# Patient Record
Sex: FEMALE | Race: WHITE | Employment: PART TIME | URBAN - METROPOLITAN AREA
[De-identification: names, ages, dates, MRNs, and addresses within clinical notes are randomized per-mention and may not be internally consistent; named-entity substitution may affect disease eponyms.]

---

## 2017-07-11 PROBLEM — N32.81 OAB (OVERACTIVE BLADDER): Status: ACTIVE | Noted: 2017-07-11

## 2018-01-16 PROBLEM — Z79.899 ENCOUNTER FOR LONG-TERM CURRENT USE OF MEDICATION: Status: ACTIVE | Noted: 2018-01-16

## 2018-07-26 PROBLEM — J30.9 ALLERGIC RHINITIS DUE TO ALLERGEN: Status: ACTIVE | Noted: 2018-07-26

## 2019-10-24 PROBLEM — G47.14: Status: ACTIVE | Noted: 2019-10-24

## 2019-11-19 PROBLEM — F50.81 BINGE-EATING DISORDER, MODERATE: Status: ACTIVE | Noted: 2019-11-19

## 2020-06-02 ENCOUNTER — HOSPITAL ENCOUNTER (OUTPATIENT)
Dept: MAMMOGRAPHY | Age: 40
Discharge: HOME | End: 2020-06-02
Attending: OBSTETRICS & GYNECOLOGY
Payer: COMMERCIAL

## 2020-06-02 DIAGNOSIS — Z12.31 VISIT FOR SCREENING MAMMOGRAM: ICD-10-CM

## 2020-06-02 PROCEDURE — 77063 BREAST TOMOSYNTHESIS BI: CPT

## 2020-07-23 PROBLEM — D48.5 NEOPLASM OF UNCERTAIN BEHAVIOR OF SKIN: Status: ACTIVE | Noted: 2020-07-23

## 2020-07-23 PROBLEM — R06.00 DYSPNEA: Status: ACTIVE | Noted: 2020-07-23

## 2021-06-04 ENCOUNTER — HOSPITAL ENCOUNTER (OUTPATIENT)
Dept: MAMMOGRAPHY | Age: 41
Discharge: HOME | End: 2021-06-04
Attending: OBSTETRICS & GYNECOLOGY
Payer: COMMERCIAL

## 2021-06-04 DIAGNOSIS — Z12.31 OTHER SCREENING MAMMOGRAM: ICD-10-CM

## 2021-06-04 PROCEDURE — 77063 BREAST TOMOSYNTHESIS BI: CPT

## 2022-03-18 PROBLEM — J30.9 ALLERGIC RHINITIS DUE TO ALLERGEN: Status: ACTIVE | Noted: 2018-07-26

## 2022-03-19 PROBLEM — D48.5 NEOPLASM OF UNCERTAIN BEHAVIOR OF SKIN: Status: ACTIVE | Noted: 2020-07-23

## 2022-03-19 PROBLEM — R06.00 DYSPNEA: Status: ACTIVE | Noted: 2020-07-23

## 2022-03-19 PROBLEM — G47.14: Status: ACTIVE | Noted: 2019-10-24

## 2022-03-20 PROBLEM — F50.81 BINGE-EATING DISORDER, MODERATE: Status: ACTIVE | Noted: 2019-11-19

## 2022-03-20 PROBLEM — N32.81 OAB (OVERACTIVE BLADDER): Status: ACTIVE | Noted: 2017-07-11

## 2022-03-20 PROBLEM — Z79.899 ENCOUNTER FOR LONG-TERM CURRENT USE OF MEDICATION: Status: ACTIVE | Noted: 2018-01-16

## 2022-03-20 PROBLEM — F50.811 BINGE-EATING DISORDER, MODERATE: Status: ACTIVE | Noted: 2019-11-19

## 2022-05-26 ENCOUNTER — OFFICE VISIT (OUTPATIENT)
Dept: OBGYN CLINIC | Age: 42
End: 2022-05-26
Payer: COMMERCIAL

## 2022-05-26 VITALS — SYSTOLIC BLOOD PRESSURE: 120 MMHG | DIASTOLIC BLOOD PRESSURE: 80 MMHG | WEIGHT: 169 LBS | BODY MASS INDEX: 29.01 KG/M2

## 2022-05-26 DIAGNOSIS — N94.3 PMS (PREMENSTRUAL SYNDROME): Primary | ICD-10-CM

## 2022-05-26 PROCEDURE — 99213 OFFICE O/P EST LOW 20 MIN: CPT | Performed by: OBSTETRICS & GYNECOLOGY

## 2022-05-26 RX ORDER — DULOXETIN HYDROCHLORIDE 60 MG/1
CAPSULE, DELAYED RELEASE ORAL
COMMUNITY
Start: 2022-05-24

## 2022-05-26 RX ORDER — ARIPIPRAZOLE 5 MG/1
TABLET ORAL
COMMUNITY
Start: 2022-02-28 | End: 2022-05-26

## 2022-05-26 RX ORDER — ARIPIPRAZOLE 10 MG/1
TABLET ORAL
COMMUNITY
Start: 2022-05-11

## 2022-05-26 ASSESSMENT — ENCOUNTER SYMPTOMS
EYES NEGATIVE: 1
GASTROINTESTINAL NEGATIVE: 1
RESPIRATORY NEGATIVE: 1
ALLERGIC/IMMUNOLOGIC NEGATIVE: 1

## 2022-05-26 NOTE — PROGRESS NOTES
Name: Jordan Farooq  Date: 5/26/2022  YOB: 1980  LMP: Patient's last menstrual period was 04/21/2022. Jimmy Heller is a 42 y.o. Jaylen Laws is here for a problem visit sweating a lot the week before her period. She is also emotional the week before her period. Menstrual status: regular cycles    Health Maintenance:  HPV vaccine: not applicable  Colonoscopy: not applicable  Dexa scan: not applicable    No flowsheet data found. Review of Systems   Constitutional: Negative. HENT: Negative. Eyes: Negative. Respiratory: Negative. Cardiovascular: Negative. Gastrointestinal: Negative. Endocrine: Negative. Genitourinary: Negative. Musculoskeletal: Negative. Skin: Negative. Allergic/Immunologic: Negative. Neurological: Negative. Hematological: Negative. Psychiatric/Behavioral: Negative. Negative for self-injury and suicidal ideas. No flowsheet data found.     Past Medical History:  No date: Lumbago  No date: Nausea & vomiting      Comment:  post-op  No date: Schizophrenia ([facility] [address]  No date: Scoliosis     Past Surgical History:  2014: DILATION AND CURETTAGE OF UTERUS      Comment:  removed endometrial polyp  1980's: HEENT      Comment:  ear surgery  1980's: HEENT      Comment:  cleft palate repair  1991: HEENT      Comment:  oral surgery       Current Outpatient Medications:     ARIPiprazole (ABILIFY) 10 mg tablet, , Disp: , Rfl:     DULoxetine (CYMBALTA) 60 MG extended release capsule, , Disp: , Rfl:     clobetasol (TEMOVATE) 0.05 % ointment, Apply topically 2 times daily, Disp: , Rfl:     desogestrel-ethinyl estradiol (KARIVA) 0.15-0.02/0.01 MG (21/5) per tablet, Take 1 tablet by mouth daily, Disp: , Rfl:     DULoxetine (CYMBALTA) 20 MG extended release capsule, 2 capsules once a day, Disp: , Rfl:     eletriptan (RELPAX) 40 MG tablet, Take 40 mg by mouth, Disp: , Rfl:     nystatin-triamcinolone (MYCOLOG II) 480343-9.1 UNIT/GM-% cream, Apply topically 2 times daily, Disp: , Rfl:     Family Cancer History:   Cancer-related family history includes Cancer in her father. There is no history of Breast Cancer, Ovarian Cancer, or Colon Cancer. Social History:  reports that she has never smoked. She has never used smokeless tobacco. She reports that she does not drink alcohol and does not use drugs. Ob History:  No obstetric history on file. Sexual History:  has no history on file for sexual activity. Vitals:  weight is 169 lb (76.7 kg). Her blood pressure is 120/80. Body mass index is 29.01 kg/m². Physical Exam  Constitutional:       General: She is awake. She is not in acute distress. Appearance: Normal appearance. She is not ill-appearing. HENT:      Head: Normocephalic and atraumatic. Eyes:      Conjunctiva/sclera: Conjunctivae normal.   Cardiovascular:      Rate and Rhythm: Normal rate. Pulmonary:      Effort: Pulmonary effort is normal.   Musculoskeletal:         General: Normal range of motion. Cervical back: Normal range of motion. Skin:     General: Skin is warm and dry. Neurological:      General: No focal deficit present. Mental Status: She is alert and oriented to person, place, and time. Motor: Motor function is intact. Coordination: Coordination is intact. Psychiatric:         Behavior: Behavior normal.         Cognition and Memory: Cognition normal.         Judgment: Judgment normal.            Assessment: 42 y.o. , No obstetric history on file. , here for problem visit, PMS  1. pms: discussed options, ocp vs observation. will follow for now.           Ernie Almanza MD

## 2022-06-06 ENCOUNTER — HOSPITAL ENCOUNTER (OUTPATIENT)
Dept: MAMMOGRAPHY | Age: 42
Discharge: HOME | End: 2022-06-09
Payer: COMMERCIAL

## 2022-06-06 DIAGNOSIS — Z12.31 ENCOUNTER FOR SCREENING MAMMOGRAM FOR MALIGNANT NEOPLASM OF BREAST: ICD-10-CM

## 2022-06-06 PROCEDURE — 77063 BREAST TOMOSYNTHESIS BI: CPT

## 2022-07-14 ENCOUNTER — OFFICE VISIT (OUTPATIENT)
Dept: OBGYN CLINIC | Age: 42
End: 2022-07-14
Payer: COMMERCIAL

## 2022-07-14 VITALS — WEIGHT: 173 LBS | SYSTOLIC BLOOD PRESSURE: 110 MMHG | BODY MASS INDEX: 29.7 KG/M2 | DIASTOLIC BLOOD PRESSURE: 80 MMHG

## 2022-07-14 DIAGNOSIS — N93.9 ABNORMAL UTERINE BLEEDING: Primary | ICD-10-CM

## 2022-07-14 PROCEDURE — 99213 OFFICE O/P EST LOW 20 MIN: CPT | Performed by: OBSTETRICS & GYNECOLOGY

## 2022-07-14 RX ORDER — MEDROXYPROGESTERONE ACETATE 10 MG/1
10 TABLET ORAL DAILY
Qty: 30 TABLET | Refills: 3 | Status: SHIPPED | OUTPATIENT
Start: 2022-07-14

## 2022-07-14 RX ORDER — NORGESTIMATE AND ETHINYL ESTRADIOL 0.25-0.035
1 KIT ORAL DAILY
Qty: 1 PACKET | Refills: 3 | Status: SHIPPED | OUTPATIENT
Start: 2022-07-14 | End: 2022-11-02 | Stop reason: SDUPTHER

## 2022-07-14 ASSESSMENT — ENCOUNTER SYMPTOMS
EYES NEGATIVE: 1
RESPIRATORY NEGATIVE: 1
ALLERGIC/IMMUNOLOGIC NEGATIVE: 1
GASTROINTESTINAL NEGATIVE: 1

## 2022-07-14 NOTE — PROGRESS NOTES
Name: David Murray  Date: 2022  YOB: 1980  LMP: Patient's last menstrual period was 2022. Michael Lozano is a 42 y.o.   who is here for aub. She has been bleeding dark blood on and off for 8 weeks. She has cont taking her ocp and has not missed any. She is currently 2 1/2 weeks into her pack. Birth control: ocp  Menstrual status: irregular cycles  Gyn Surgery: endometrial polypectomy    Health Maintenance:  Pap Smear:3/20/22 negative  Any history of abnormal pap smear? no  Mammo: 2022 negative  HPV vaccine: not done  Colonoscopy: not ever done  Dexa scan: not applicable    Review of Systems   Constitutional: Negative. HENT: Negative. Eyes: Negative. Respiratory: Negative. Cardiovascular: Negative. Gastrointestinal: Negative. Endocrine: Negative. Genitourinary: Negative. Musculoskeletal: Negative. Skin: Negative. Allergic/Immunologic: Negative. Neurological: Negative. Hematological: Negative. Psychiatric/Behavioral: Negative. Negative for self-injury and suicidal ideas.         Past Medical History:  No date: Lumbago  No date: Nausea & vomiting      Comment:  post-op  No date: Schizophrenia ([facility] [address]  No date: Scoliosis     Past Surgical History:  2014: DILATION AND CURETTAGE OF UTERUS      Comment:  removed endometrial polyp  : HEENT      Comment:  ear surgery  s: HEENT      Comment:  cleft palate repair  1991: HEENT      Comment:  oral surgery       Current Outpatient Medications:     ARIPiprazole (ABILIFY) 10 mg tablet, , Disp: , Rfl:     DULoxetine (CYMBALTA) 60 MG extended release capsule, , Disp: , Rfl:     clobetasol (TEMOVATE) 0.05 % ointment, Apply topically 2 times daily, Disp: , Rfl:     desogestrel-ethinyl estradiol (KARIVA) 0.15-0.02/0.01 MG () per tablet, Take 1 tablet by mouth daily, Disp: , Rfl:     DULoxetine (CYMBALTA) 20 MG extended release capsule, 2 capsules once a day, Disp: , Rfl:     eletriptan (RELPAX) 40 MG tablet, Take 40 mg by mouth, Disp: , Rfl:     nystatin-triamcinolone (MYCOLOG II) 266868-3.1 UNIT/GM-% cream, Apply topically 2 times daily, Disp: , Rfl:     Family Cancer History:   Cancer-related family history includes Cancer in her father. There is no history of Breast Cancer, Ovarian Cancer, or Colon Cancer. Social History:  reports that she has never smoked. She has never used smokeless tobacco. She reports that she does not drink alcohol and does not use drugs. Ob History:  # 1 - Date: None, Sex: None, Weight: None, GA: None, Delivery: None, Apgar1: None, Apgar5: None, Living: None, Birth Comments: None         Sexual History:  reports previously being sexually active. She reports using the following method of birth control/protection: Pill. PHYSICAL EXAM:  Vitals:  weight is 173 lb (78.5 kg). Her blood pressure is 110/80. Body mass index is 29.7 kg/m². Physical Exam  Constitutional:       General: She is awake. She is not in acute distress. Appearance: Normal appearance. She is not ill-appearing. HENT:      Head: Normocephalic and atraumatic. Eyes:      Conjunctiva/sclera: Conjunctivae normal.   Cardiovascular:      Rate and Rhythm: Normal rate. Pulmonary:      Effort: Pulmonary effort is normal.   Musculoskeletal:         General: Normal range of motion. Cervical back: Normal range of motion. Skin:     General: Skin is warm and dry. Neurological:      General: No focal deficit present. Mental Status: She is alert and oriented to person, place, and time. Motor: Motor function is intact. Coordination: Coordination is intact. Psychiatric:         Behavior: Behavior normal.         Cognition and Memory: Cognition normal.         Judgment: Judgment normal.          The external genitalia is normal.  Urethral meatus is normal. Vagina is pink and rugated. The bladder and urethra are normal  dark blood in vault.   The cervix is normal .  The uterus is normal. The ovaries are normal.       Assessment: 42 y.o. , , here for problem visit for irregular bleeding on mircette  1. Will stop ocp, do a provera wd and then restart orthocyclen  2.          Herminio Patiño MD

## 2022-07-15 ENCOUNTER — TELEPHONE (OUTPATIENT)
Dept: OBGYN CLINIC | Age: 42
End: 2022-07-15

## 2022-07-15 DIAGNOSIS — R30.0 DYSURIA: Primary | ICD-10-CM

## 2022-08-11 ENCOUNTER — OFFICE VISIT (OUTPATIENT)
Dept: OBGYN CLINIC | Age: 42
End: 2022-08-11
Payer: COMMERCIAL

## 2022-08-11 VITALS — DIASTOLIC BLOOD PRESSURE: 78 MMHG | SYSTOLIC BLOOD PRESSURE: 110 MMHG | WEIGHT: 174 LBS | BODY MASS INDEX: 29.87 KG/M2

## 2022-08-11 DIAGNOSIS — N92.0 MENORRHAGIA WITH REGULAR CYCLE: Primary | ICD-10-CM

## 2022-08-11 PROCEDURE — 99213 OFFICE O/P EST LOW 20 MIN: CPT | Performed by: OBSTETRICS & GYNECOLOGY

## 2022-08-11 ASSESSMENT — ENCOUNTER SYMPTOMS
EYES NEGATIVE: 1
RESPIRATORY NEGATIVE: 1
GASTROINTESTINAL NEGATIVE: 1
ALLERGIC/IMMUNOLOGIC NEGATIVE: 1

## 2022-08-11 NOTE — PROGRESS NOTES
Name: Jd Timmons  Date: 2022  YOB: 1980  LMP: Patient's last menstrual period was 2022. Nima Lamb is a 42 y.o.   who is here for a follow up. She was suppose to take provera for 10 days, have a period then start ocp. She has taken provera for almost 3 weeks. She is not bleeding. Birth control: none, does not want to be pregnant  Menstrual status: irregular cycles  Gyn Surgery: none    Health Maintenance:  Pap Smear: 3/3/22 negative  Any history of abnormal pap smear? no  Mammo: 2022  HPV vaccine: not done  Colonoscopy: not applicable  Dexa scan: not applicable    Review of Systems   Constitutional: Negative. HENT: Negative. Eyes: Negative. Respiratory: Negative. Cardiovascular: Negative. Gastrointestinal: Negative. Endocrine: Negative. Genitourinary: Negative. Musculoskeletal: Negative. Skin: Negative. Allergic/Immunologic: Negative. Neurological: Negative. Hematological: Negative. Psychiatric/Behavioral: Negative. Negative for self-injury and suicidal ideas.        Past Medical History:  No date: Lumbago  No date: Nausea & vomiting      Comment:  post-op  No date: Schizophrenia ([facility] [address]  No date: Scoliosis     Past Surgical History:  2014: DILATION AND CURETTAGE OF UTERUS      Comment:  removed endometrial polyp  : HEENT      Comment:  ear surgery  : HEENT      Comment:  cleft palate repair  1991: HEENT      Comment:  oral surgery       Current Outpatient Medications:     medroxyPROGESTERone (PROVERA) 10 MG tablet, Take 1 tablet by mouth daily, Disp: 30 tablet, Rfl: 3    norgestimate-ethinyl estradiol (ORTHO-CYCLEN, 28,) 0.25-35 MG-MCG per tablet, Take 1 tablet by mouth daily, Disp: 1 packet, Rfl: 3    ARIPiprazole (ABILIFY) 10 mg tablet, , Disp: , Rfl:     DULoxetine (CYMBALTA) 60 MG extended release capsule, , Disp: , Rfl:     clobetasol (TEMOVATE) 0.05 % ointment, Apply topically 2 times daily, Disp: , Rfl: desogestrel-ethinyl estradiol (KARIVA) 0.15-0.02/0.01 MG (21/5) per tablet, Take 1 tablet by mouth daily, Disp: , Rfl:     DULoxetine (CYMBALTA) 20 MG extended release capsule, 2 capsules once a day, Disp: , Rfl:     eletriptan (RELPAX) 40 MG tablet, Take 40 mg by mouth, Disp: , Rfl:     nystatin-triamcinolone (MYCOLOG II) 271835-0.1 UNIT/GM-% cream, Apply topically 2 times daily, Disp: , Rfl:     Family Cancer History:   Cancer-related family history includes Cancer in her father. There is no history of Breast Cancer, Ovarian Cancer, or Colon Cancer. Social History:  reports that she has never smoked. She has never used smokeless tobacco. She reports that she does not drink alcohol and does not use drugs. Ob History:  # 1 - Date: None, Sex: None, Weight: None, GA: None, Delivery: None, Apgar1: None, Apgar5: None, Living: None, Birth Comments: None         Sexual History:  reports that she is not currently sexually active. She reports using the following method of birth control/protection: Pill. PHYSICAL EXAM:  Vitals:  weight is 174 lb (78.9 kg). Body mass index is 29.87 kg/m². Physical Exam  Constitutional:       General: She is awake. She is not in acute distress. Appearance: Normal appearance. She is not ill-appearing. HENT:      Head: Normocephalic and atraumatic. Eyes:      Conjunctiva/sclera: Conjunctivae normal.   Cardiovascular:      Rate and Rhythm: Normal rate. Pulmonary:      Effort: Pulmonary effort is normal.   Musculoskeletal:         General: Normal range of motion. Cervical back: Normal range of motion. Skin:     General: Skin is warm and dry. Neurological:      General: No focal deficit present. Mental Status: She is alert and oriented to person, place, and time. Motor: Motor function is intact. Coordination: Coordination is intact.    Psychiatric:         Behavior: Behavior normal.         Cognition and Memory: Cognition normal.         Judgment: Judgment normal.          Assessment: 42 y.o. , , here for follow up for menorrhagia  I wrote out for her to take provera until Sat, expect menses mon, start ocp following sat.   If no period by thurs will call office and not start ocp         Yolanda Foss MD

## 2022-09-06 ENCOUNTER — OFFICE VISIT (OUTPATIENT)
Dept: UROLOGY | Age: 42
End: 2022-09-06
Payer: COMMERCIAL

## 2022-09-06 DIAGNOSIS — N32.81 OAB (OVERACTIVE BLADDER): Primary | ICD-10-CM

## 2022-09-06 DIAGNOSIS — N39.41 URGE INCONTINENCE: ICD-10-CM

## 2022-09-06 LAB
BILIRUBIN, URINE, POC: NEGATIVE
BLOOD URINE, POC: NEGATIVE
GLUCOSE URINE, POC: NEGATIVE
KETONES, URINE, POC: NEGATIVE
LEUKOCYTE ESTERASE, URINE, POC: NEGATIVE
NITRITE, URINE, POC: NEGATIVE
PH, URINE, POC: 6.5 (ref 4.6–8)
PROTEIN,URINE, POC: NEGATIVE
SPECIFIC GRAVITY, URINE, POC: 1.02 (ref 1–1.03)
URINALYSIS CLARITY, POC: NORMAL
URINALYSIS COLOR, POC: NORMAL
UROBILINOGEN, POC: NORMAL

## 2022-09-06 PROCEDURE — 81003 URINALYSIS AUTO W/O SCOPE: CPT | Performed by: NURSE PRACTITIONER

## 2022-09-06 PROCEDURE — 99203 OFFICE O/P NEW LOW 30 MIN: CPT | Performed by: NURSE PRACTITIONER

## 2022-09-06 RX ORDER — LORATADINE 10 MG/1
10 TABLET ORAL DAILY
COMMUNITY
Start: 2022-06-23

## 2022-09-06 RX ORDER — MONTELUKAST SODIUM 10 MG/1
10 TABLET ORAL NIGHTLY
COMMUNITY
Start: 2022-05-10

## 2022-09-06 ASSESSMENT — ENCOUNTER SYMPTOMS
EYE PAIN: 0
NAUSEA: 0
ABDOMINAL PAIN: 0
SKIN LESIONS: 0
HEARTBURN: 0
COUGH: 0
CONSTIPATION: 0
SHORTNESS OF BREATH: 0
BLOOD IN STOOL: 0
EYE DISCHARGE: 0
INDIGESTION: 0
WHEEZING: 0
BACK PAIN: 0
VOMITING: 0
DIARRHEA: 0

## 2022-09-06 NOTE — PROGRESS NOTES
[address], [facility] [address]  [phone]          Evans Chandler  : 1980    Chief Complaint   Patient presents with    New Patient          HPI     Evans Chandler is a 42 y.o. female    Patient referred to us due to ongoing urinary issues. The original referral was made for dysuria. Patient reports she is not having any dysuria but is having urinary frequency, nocturia and urge incontinence. She tells me between  and  she has had 2 or 3 pregnancies. She had partial birth abortions with these pregnancies. She feels that since that procedure she has had ongoing issues with her bladder. Her menstrual cycle has been abnormal.  She has recently been switched over to a different type of birth control to help control the menstrual cycle. She reports that during the day that she has to void about every 2 hours. Once she gets the urge to urinate she rushes to the bathroom and it sometimes does not make it. She wears a 1 overnight poise pad a day. There has been a couple occasions that she has leaked enough urine that she have to change her clothes. She denies any significant urinary infections. Unknown past family medical history. Her urine is clear today.   Past Medical History:   Diagnosis Date    Lumbago     Nausea & vomiting     post-op    Schizophrenia ([address]     Scoliosis      Past Surgical History:   Procedure Laterality Date    DILATION AND CURETTAGE OF UTERUS      removed endometrial polyp    HEENT      ear surgery    HEENT      cleft palate repair    HEENT      oral surgery     Current Outpatient Medications   Medication Sig Dispense Refill    montelukast (SINGULAIR) 10 MG tablet Take 10 mg by mouth nightly      loratadine (CLARITIN) 10 MG tablet Take 10 mg by mouth daily      ARIPiprazole (ABILIFY) 10 mg tablet       DULoxetine (CYMBALTA) 60 MG extended release capsule       DULoxetine (CYMBALTA) 20 MG extended release capsule 2 capsules once a day      nystatin-triamcinolone (MYCOLOG II) 266723-8.1 UNIT/GM-% cream Apply topically 2 times daily      medroxyPROGESTERone (PROVERA) 10 MG tablet Take 1 tablet by mouth daily (Patient not taking: Reported on 9/6/2022) 30 tablet 3    norgestimate-ethinyl estradiol (ORTHO-CYCLEN, 28,) 0.25-35 MG-MCG per tablet Take 1 tablet by mouth daily (Patient not taking: Reported on 9/6/2022) 1 packet 3    clobetasol (TEMOVATE) 0.05 % ointment Apply topically 2 times daily (Patient not taking: Reported on 9/6/2022)      desogestrel-ethinyl estradiol (KARIVA) 0.15-0.02/0.01 MG (21/5) per tablet Take 1 tablet by mouth daily (Patient not taking: Reported on 9/6/2022)      eletriptan (RELPAX) 40 MG tablet Take 40 mg by mouth (Patient not taking: Reported on 9/6/2022)       No current facility-administered medications for this visit.      Allergies   Allergen Reactions    Latex Rash     Turns red    Ciprofloxacin Other (See Comments)    Amoxicillin Diarrhea    Cephalexin Other (See Comments)    Cyclobenzaprine Hives    Naproxen Hives     Social History     Socioeconomic History    Marital status: Single     Spouse name: Not on file    Number of children: Not on file    Years of education: Not on file    Highest education level: Not on file   Occupational History    Not on file   Tobacco Use    Smoking status: Never    Smokeless tobacco: Never   Substance and Sexual Activity    Alcohol use: No    Drug use: Never    Sexual activity: Not Currently     Birth control/protection: Pill   Other Topics Concern    Not on file   Social History Narrative    Denies physical or sexual abuse     Social Determinants of Health     Financial Resource Strain: Not on file   Food Insecurity: Not on file   Transportation Needs: Not on file   Physical Activity: Not on file   Stress: Not on file   Social Connections: Not on file   Intimate Partner Violence: Not on file   Housing Stability: Not on file     Family History Problem Relation Age of Onset    Breast Cancer Neg Hx     Hypertension Father     Cancer Father     Ovarian Cancer Neg Hx     Colon Cancer Neg Hx        Review of Systems  Constitutional:   Negative for fever, chills, appetite change, malaise/fatigue, headaches and weight loss. Skin:  Negative for skin lesions, rash and itching. Eyes:  Negative for visual disturbance, eye pain and eye discharge. ENT:  Negative for difficulty articulating words, pain swallowing, high frequency hearing loss and dry mouth. Respiratory:  Negative for cough, blood in sputum, shortness of breath and wheezing. Cardiovascular:  Negative for chest pain, hypertension, irregular heartbeat, leg pain, leg swelling, regular rate and rhythm and varicose veins. GI:  Negative for nausea, vomiting, abdominal pain, blood in stool, constipation, diarrhea, indigestion and heartburn. Genitourinary: Positive for flank pain, nocturia, urgency, leakage w/ urge, frequent urination and menstrual problem. Negative for urinary burning, hematuria, recurrent UTIs, history of urolithiasis, slower stream, straining, incomplete emptying, sexually transmitted disease, endometriosis, vaginal pain and hysterectomy. Number of pregnancies:. Number of births: 0.  Musculoskeletal:  Negative for back pain, bone pain, arthralgias, tenderness, muscle weakness and neck pain. Neurological:  Negative for dizziness, focal weakness, numbness, seizures and tremors. Psychological:  Negative for depression and psychiatric problem. Endocrine:  Negative for cold intolerance, thirst, excessive urination, fatigue and heat intolerance. Hem/Lymphatic:  Negative for easy bleeding, easy bruising and frequent infections.     Urinalysis  UA - Dipstick  Results for orders placed or performed in visit on 09/06/22   AMB POC URINALYSIS DIP STICK AUTO W/O MICRO   Result Value Ref Range    Color (UA POC)      Clarity (UA POC)      Glucose, Urine, POC Negative Negative    Bilirubin, Urine, POC Negative Negative    KETONES, Urine, POC Negative Negative    Specific Gravity, Urine, POC 1.025 1.001 - 1.035    Blood (UA POC) Negative Negative    pH, Urine, POC 6.5 4.6 - 8.0    Protein, Urine, POC Negative Negative    Urobilinogen, POC 0.2 mg/dL     Nitrite, Urine, POC Negative Negative    Leukocyte Esterase, Urine, POC Negative Negative       PHYSICAL EXAM    GENERAL: No acute distress, Awake, Alert, Oriented X 3, Gait normal  ABDOMEN: soft, non tender, non-distended, positive bowel sounds, no organomegaly, no palpable masses, no guarding, no rebound tenderness  SKIN: No rash, no erythema, no lacerations or abrasions, no ecchymosis  MUSCULOSKELETAL - MAEW, no edema       Assessment and Plan    ICD-10-CM    1. OAB (overactive bladder)  N32.81 AMB POC URINALYSIS DIP STICK AUTO W/O MICRO      2. Urge incontinence  N39.41         Symptoms of OAB were discussed in detail. I encouraged patient to try to increase her water intake. I have instructed pt there are certain foods and beverages that will cause her symptoms to be worse. These include caffeine, alcohol and potentially spicy foods. Treatment options for OAB were also discussed. These include anticholinergic therapy, PTNS, biofeedback, Botox and, InterStim. PLAN:  Patient has not tried any medication therapy for the issue. Add Gemtesa 75 mg p.o. daily  She will follow-up in 6 weeks  Will reassess symptoms at this time and adjust treatment plan if needed. Stacy Shultz, NP, APRN - NP  Dr. Timoteo Ellison is supervising physician today and he approves plan of care. Return in about 6 weeks (around 10/18/2022) for for recheck. Elements of this note have been dictated using speech recognition software. Although reviewed, errors of speech recognition may have occurred.

## 2022-09-15 ENCOUNTER — OFFICE VISIT (OUTPATIENT)
Dept: OBGYN CLINIC | Age: 42
End: 2022-09-15
Payer: COMMERCIAL

## 2022-09-15 VITALS
DIASTOLIC BLOOD PRESSURE: 62 MMHG | BODY MASS INDEX: 29.06 KG/M2 | WEIGHT: 170.2 LBS | SYSTOLIC BLOOD PRESSURE: 118 MMHG | HEIGHT: 64 IN

## 2022-09-15 DIAGNOSIS — N94.6 MENSTRUAL PAIN: Primary | ICD-10-CM

## 2022-09-15 PROCEDURE — 99213 OFFICE O/P EST LOW 20 MIN: CPT | Performed by: OBSTETRICS & GYNECOLOGY

## 2022-09-15 ASSESSMENT — ENCOUNTER SYMPTOMS
GASTROINTESTINAL NEGATIVE: 1
RESPIRATORY NEGATIVE: 1
ALLERGIC/IMMUNOLOGIC NEGATIVE: 1
EYES NEGATIVE: 1

## 2022-09-15 NOTE — PROGRESS NOTES
Name: Osei Cook  Date: 9/15/2022  YOB: 1980  LMP: Patient's last menstrual period was 2022 (exact date). Sid Boyce is a 42 y.o.   who is here for a problem visit for discomfort with inserting tampons. Pt reports issue began in 2017 . Birth control: ocp  Menstrual status: regular cycles  Gyn Surgery:  pt reports polyp removed from uterine wall. Health Maintenance:  Pap Smear: last pap in , pathology WNL  Any history of abnormal pap smear? no  Mammo: last mammo in , pathology WNL  HPV vaccine: not done  Colonoscopy: not ever done  Dexa scan: not needed    Review of Systems   Constitutional: Negative. HENT: Negative. Eyes: Negative. Respiratory: Negative. Cardiovascular: Negative. Gastrointestinal: Negative. Endocrine: Negative. Genitourinary: Negative. Musculoskeletal: Negative. Skin: Negative. Allergic/Immunologic: Negative. Neurological: Negative. Hematological: Negative. Psychiatric/Behavioral: Negative.         Past Medical History:  No date: Lumbago  No date: Nausea & vomiting      Comment:  post-op  No date: Schizophrenia ([facility] [address]  No date: Scoliosis     Past Surgical History:  2014: DILATION AND CURETTAGE OF UTERUS      Comment:  removed endometrial polyp  s: HEENT      Comment:  ear surgery  : HEENT      Comment:  cleft palate repair  1991: HEENT      Comment:  oral surgery       Current Outpatient Medications:     montelukast (SINGULAIR) 10 MG tablet, Take 10 mg by mouth nightly, Disp: , Rfl:     loratadine (CLARITIN) 10 MG tablet, Take 10 mg by mouth daily, Disp: , Rfl:     ARIPiprazole (ABILIFY) 10 mg tablet, , Disp: , Rfl:     DULoxetine (CYMBALTA) 60 MG extended release capsule, , Disp: , Rfl:     DULoxetine (CYMBALTA) 20 MG extended release capsule, 2 capsules once a day, Disp: , Rfl:     nystatin-triamcinolone (MYCOLOG II) 284169-3.1 UNIT/GM-% cream, Apply topically 2 times daily, Disp: , Rfl: medroxyPROGESTERone (PROVERA) 10 MG tablet, Take 1 tablet by mouth daily (Patient not taking: Reported on 9/6/2022), Disp: 30 tablet, Rfl: 3    norgestimate-ethinyl estradiol (ORTHO-CYCLEN, 28,) 0.25-35 MG-MCG per tablet, Take 1 tablet by mouth daily (Patient not taking: Reported on 9/6/2022), Disp: 1 packet, Rfl: 3    clobetasol (TEMOVATE) 0.05 % ointment, Apply topically 2 times daily (Patient not taking: Reported on 9/6/2022), Disp: , Rfl:     desogestrel-ethinyl estradiol (KARIVA) 0.15-0.02/0.01 MG (21/5) per tablet, Take 1 tablet by mouth daily (Patient not taking: Reported on 9/6/2022), Disp: , Rfl:     eletriptan (RELPAX) 40 MG tablet, Take 40 mg by mouth (Patient not taking: Reported on 9/6/2022), Disp: , Rfl:     Family Cancer History:   Cancer-related family history includes Cancer in her father. There is no history of Breast Cancer, Ovarian Cancer, or Colon Cancer. Social History:  reports that she has never smoked. She has never used smokeless tobacco. She reports that she does not drink alcohol and does not use drugs. Ob History:  # 1 - Date: None, Sex: None, Weight: None, GA: None, Delivery: None, Apgar1: None, Apgar5: None, Living: None, Birth Comments: None         Sexual History:  reports that she is not currently sexually active. She reports using the following method of birth control/protection: Pill. PHYSICAL EXAM:  Vitals:  height is 5' 4\" (1.626 m) and weight is 170 lb 3.2 oz (77.2 kg). Her blood pressure is 118/62. Body mass index is 29.21 kg/m². Physical Exam  Constitutional:       General: She is awake. She is not in acute distress. Appearance: Normal appearance. She is not ill-appearing. HENT:      Head: Normocephalic and atraumatic. Eyes:      Conjunctiva/sclera: Conjunctivae normal.   Cardiovascular:      Rate and Rhythm: Normal rate. Pulmonary:      Effort: Pulmonary effort is normal.   Musculoskeletal:         General: Normal range of motion.       Cervical back: Normal range of motion. Skin:     General: Skin is warm and dry. Neurological:      General: No focal deficit present. Mental Status: She is alert and oriented to person, place, and time. Motor: Motor function is intact. Coordination: Coordination is intact. Psychiatric:         Behavior: Behavior normal.         Cognition and Memory: Cognition normal.         Judgment: Judgment normal.      Ext erythematous  Vagina: pink, no lesions    PROCEDURE: tampon insertion  Regular tampon inserted        Assessment: 42 y.o. , , here for problem visit for discomfort with tampons  Discussed tampon use not required  Tampon inserted in the office, she reports not uncomfortable and different from when she does it  Reviewed tampon insertion and making sure it was placed all of the way in.  4. Discussed lower flank discomfort c/w musculoskeletal pain.   Will cont to use ibuprofen prn

## 2022-11-03 RX ORDER — NORGESTIMATE AND ETHINYL ESTRADIOL 0.25-0.035
1 KIT ORAL DAILY
Qty: 1 PACKET | Refills: 3 | Status: SHIPPED | OUTPATIENT
Start: 2022-11-03

## 2022-11-10 ENCOUNTER — OFFICE VISIT (OUTPATIENT)
Dept: UROLOGY | Age: 42
End: 2022-11-10
Payer: COMMERCIAL

## 2022-11-10 DIAGNOSIS — N39.41 URGE INCONTINENCE: ICD-10-CM

## 2022-11-10 DIAGNOSIS — N32.81 OAB (OVERACTIVE BLADDER): Primary | ICD-10-CM

## 2022-11-10 LAB
BILIRUBIN, URINE, POC: NEGATIVE
BLOOD URINE, POC: NORMAL
GLUCOSE URINE, POC: NEGATIVE
KETONES, URINE, POC: NEGATIVE
LEUKOCYTE ESTERASE, URINE, POC: NEGATIVE
NITRITE, URINE, POC: NEGATIVE
PH, URINE, POC: 5.5 (ref 4.6–8)
PROTEIN,URINE, POC: NEGATIVE
SPECIFIC GRAVITY, URINE, POC: 1.02 (ref 1–1.03)
URINALYSIS CLARITY, POC: NORMAL
URINALYSIS COLOR, POC: NORMAL
UROBILINOGEN, POC: NORMAL

## 2022-11-10 PROCEDURE — 81003 URINALYSIS AUTO W/O SCOPE: CPT | Performed by: NURSE PRACTITIONER

## 2022-11-10 PROCEDURE — 99213 OFFICE O/P EST LOW 20 MIN: CPT | Performed by: NURSE PRACTITIONER

## 2022-11-10 ASSESSMENT — ENCOUNTER SYMPTOMS
NAUSEA: 0
BACK PAIN: 0

## 2022-11-10 NOTE — PROGRESS NOTES
[facility] [address] [place] [address], [facility] [address]  [phone]          Devin Neely  : 1980    Chief Complaint   Patient presents with    Follow-up          HPI     Devin Neely is a 36 y.o. female    Patient returns today for follow-up on urinary frequency, urgency. At her last visit she was given Gemtesa 75 mg p.o. daily. She is back today for recheck and tells me that the medication has helped a great deal.  Unfortunately she says the Gladis Jason is very expensive and would like to try a medication like Gemtesa. Referred to us due to ongoing urinary issues. The original referral was made for dysuria. Patient reports she is not having any dysuria but is having urinary frequency, nocturia and urge incontinence. She tells me between  and  she has had 2 or 3 pregnancies. She had partial birth abortions with these pregnancies. She feels that since that procedure she has had ongoing issues with her bladder. Her menstrual cycle has been abnormal.  She has recently been switched over to a different type of birth control to help control the menstrual cycle. She reports that during the day that she has to void about every 2 hours. Once she gets the urge to urinate she rushes to the bathroom and it sometimes does not make it. She wears a 1 overnight poise pad a day. There has been a couple occasions that she has leaked enough urine that she have to change her clothes. She denies any significant urinary infections. Unknown past family medical history. Her urine is clear today.       Past Medical History:   Diagnosis Date    Lumbago     Nausea & vomiting     post-op    Schizophrenia ([address]     Scoliosis      Past Surgical History:   Procedure Laterality Date    DILATION AND CURETTAGE OF UTERUS  2014    removed endometrial polyp    HEENT      ear surgery    HEENT      cleft palate repair    HEENT      oral surgery     Current Outpatient Medications   Medication Sig Dispense Refill    mirabegron (MYRBETRIQ) 50 MG TB24 Take 50 mg by mouth daily 30 tablet 11    norgestimate-ethinyl estradiol (ORTHO-CYCLEN, 28,) 0.25-35 MG-MCG per tablet Take 1 tablet by mouth daily 1 packet 3    montelukast (SINGULAIR) 10 MG tablet Take 10 mg by mouth nightly      loratadine (CLARITIN) 10 MG tablet Take 10 mg by mouth daily      medroxyPROGESTERone (PROVERA) 10 MG tablet Take 1 tablet by mouth daily 30 tablet 3    ARIPiprazole (ABILIFY) 10 mg tablet       DULoxetine (CYMBALTA) 60 MG extended release capsule       clobetasol (TEMOVATE) 0.05 % ointment Apply topically 2 times daily      desogestrel-ethinyl estradiol (KARIVA) 0.15-0.02/0.01 MG (21/5) per tablet Take 1 tablet by mouth daily      DULoxetine (CYMBALTA) 20 MG extended release capsule 2 capsules once a day      eletriptan (RELPAX) 40 MG tablet Take 40 mg by mouth      nystatin-triamcinolone (MYCOLOG II) 610186-1.1 UNIT/GM-% cream Apply topically 2 times daily       No current facility-administered medications for this visit.      Allergies   Allergen Reactions    Latex Rash     Turns red    Ciprofloxacin Other (See Comments)    Cephalexin Other (See Comments)    Cyclobenzaprine Hives    Naproxen Hives     Social History     Socioeconomic History    Marital status: Single     Spouse name: Not on file    Number of children: Not on file    Years of education: Not on file    Highest education level: Not on file   Occupational History    Not on file   Tobacco Use    Smoking status: Never    Smokeless tobacco: Never   Substance and Sexual Activity    Alcohol use: No    Drug use: Never    Sexual activity: Not Currently     Birth control/protection: Pill   Other Topics Concern    Not on file   Social History Narrative    Denies physical or sexual abuse     Social Determinants of Health     Financial Resource Strain: Not on file   Food Insecurity: Not on file   Transportation Needs: Not on file   Physical Activity: Not on file   Stress: Not on file   Social Connections: Not on file   Intimate Partner Violence: Not on file   Housing Stability: Not on file     Family History   Problem Relation Age of Onset    Breast Cancer Neg Hx     Hypertension Father     Cancer Father     Ovarian Cancer Neg Hx     Colon Cancer Neg Hx        Review of Systems  Constitutional:   Negative for fever. GI:  Negative for nausea. Musculoskeletal:  Negative for back pain. Urinalysis  UA - Dipstick  Results for orders placed or performed in visit on 11/10/22   AMB POC URINALYSIS DIP STICK AUTO W/O MICRO   Result Value Ref Range    Color (UA POC)      Clarity (UA POC)      Glucose, Urine, POC Negative Negative    Bilirubin, Urine, POC Negative Negative    KETONES, Urine, POC Negative Negative    Specific Gravity, Urine, POC 1.020 1.001 - 1.035    Blood (UA POC) Moderate Negative    pH, Urine, POC 5.5 4.6 - 8.0    Protein, Urine, POC Negative Negative    Urobilinogen, POC 0.2 mg/dL     Nitrite, Urine, POC Negative Negative    Leukocyte Esterase, Urine, POC Negative Negative     PHYSICAL EXAM    GENERAL: No acute distress, Awake, Alert, Oriented X 3, Gait normal  ABDOMEN: soft, non tender, non-distended, positive bowel sounds, no organomegaly, no palpable masses, no guarding, no rebound tenderness  SKIN: No rash, no erythema, no lacerations or abrasions, no ecchymosis  MUSCULOSKELETAL - MAEW, no edema       Assessment and Plan    ICD-10-CM    1. OAB (overactive bladder)  N32.81 AMB POC URINALYSIS DIP STICK AUTO W/O MICRO      2. Urge incontinence  N39.41 AMB POC URINALYSIS DIP STICK AUTO W/O MICRO        PLAN:  Myrbetriq 50 mg p.o. daily will be initiated  Patient will let me know if this medication is expensive  If so we will need to have her ask the pharmacist what anticholinergic is covered on her plan. Sisi Schaefer, NP, APRN - NP  Dr. Priscilla Oneal is supervising physician today and he approves plan of care.     Return in about 6 months (around 5/10/2023) for for recheck. Elements of this note have been dictated using speech recognition software. Although reviewed, errors of speech recognition may have occurred.

## 2022-11-17 ENCOUNTER — OFFICE VISIT (OUTPATIENT)
Dept: OBGYN CLINIC | Age: 42
End: 2022-11-17
Payer: COMMERCIAL

## 2022-11-17 DIAGNOSIS — Z30.09 BIRTH CONTROL COUNSELING: Primary | ICD-10-CM

## 2022-11-17 PROCEDURE — 99213 OFFICE O/P EST LOW 20 MIN: CPT | Performed by: OBSTETRICS & GYNECOLOGY

## 2022-11-17 RX ORDER — CLOBETASOL PROPIONATE 0.5 MG/G
OINTMENT TOPICAL
Qty: 30 G | Refills: 0 | Status: SHIPPED | OUTPATIENT
Start: 2022-11-17

## 2022-11-17 NOTE — PROGRESS NOTES
Name: Whitney Woodard  Date: 2022  YOB: 1980  LMP: No LMP recorded. Braxton Singh is a 36 y.o.   who is here for a problem visit for discuss birth control . She thinks she would like an Iud or depo. Also she has questions about my chart and a refill of the temovate she uses it about once a year. Birth control: ocp  Menstrual status: regular cycles  Gyn Surgery: none    Health Maintenance:  Pap Smear: last pap in 3/3/22, pathology negaitve  Any history of abnormal pap smear? no  Mammo: last mammo in 2022, pathology negative  HPV vaccine: not done  Colonoscopy: not applicable  Dexa scan: not applicable    Review of Systems   Constitutional: Negative. HENT: Negative. Eyes: Negative. Respiratory: Negative. Cardiovascular: Negative. Gastrointestinal: Negative. Endocrine: Negative. Genitourinary: Negative. Musculoskeletal: Negative. Skin: Negative. Allergic/Immunologic: Negative. Neurological: Negative. Hematological: Negative. Psychiatric/Behavioral: Negative. Negative for self-injury and suicidal ideas.        Past Medical History:  No date: Lumbago  No date: Nausea & vomiting      Comment:  post-op  No date: Schizophrenia ([facility] [address]  No date: Scoliosis     Past Surgical History:  2014: DILATION AND CURETTAGE OF UTERUS      Comment:  removed endometrial polyp  s: HEENT      Comment:  ear surgery  s: HEENT      Comment:  cleft palate repair  1991: HEENT      Comment:  oral surgery       Current Outpatient Medications:     mirabegron (MYRBETRIQ) 50 MG TB24, Take 50 mg by mouth daily, Disp: 30 tablet, Rfl: 11    norgestimate-ethinyl estradiol (ORTHO-CYCLEN, 28,) 0.25-35 MG-MCG per tablet, Take 1 tablet by mouth daily, Disp: 1 packet, Rfl: 3    montelukast (SINGULAIR) 10 MG tablet, Take 10 mg by mouth nightly, Disp: , Rfl:     loratadine (CLARITIN) 10 MG tablet, Take 10 mg by mouth daily, Disp: , Rfl:     medroxyPROGESTERone (PROVERA) 10 MG tablet, Take 1 tablet by mouth daily, Disp: 30 tablet, Rfl: 3    ARIPiprazole (ABILIFY) 10 mg tablet, , Disp: , Rfl:     DULoxetine (CYMBALTA) 60 MG extended release capsule, , Disp: , Rfl:     clobetasol (TEMOVATE) 0.05 % ointment, Apply topically 2 times daily, Disp: , Rfl:     desogestrel-ethinyl estradiol (KARIVA) 0.15-0.02/0.01 MG (21/5) per tablet, Take 1 tablet by mouth daily, Disp: , Rfl:     DULoxetine (CYMBALTA) 20 MG extended release capsule, 2 capsules once a day, Disp: , Rfl:     eletriptan (RELPAX) 40 MG tablet, Take 40 mg by mouth, Disp: , Rfl:     nystatin-triamcinolone (MYCOLOG II) 406340-0.1 UNIT/GM-% cream, Apply topically 2 times daily, Disp: , Rfl:     Family Cancer History:   Cancer-related family history includes Cancer in her father. There is no history of Breast Cancer, Ovarian Cancer, or Colon Cancer. Social History:  reports that she has never smoked. She has never used smokeless tobacco. She reports that she does not drink alcohol and does not use drugs. Ob History:  # 1 - Date: None, Sex: None, Weight: None, GA: None, Delivery: None, Apgar1: None, Apgar5: None, Living: None, Birth Comments: None         Sexual History:  reports that she is not currently sexually active. She reports using the following method of birth control/protection: Pill. PHYSICAL EXAM:  Vitals:  vitals were not taken for this visit. There is no height or weight on file to calculate BMI. Physical Exam  Constitutional:       Appearance: Normal appearance. HENT:      Head: Normocephalic and atraumatic. Eyes:      Conjunctiva/sclera: Conjunctivae normal.   Neck:      Thyroid: No thyroid mass. Cardiovascular:      Rate and Rhythm: Normal rate. Pulmonary:      Effort: Pulmonary effort is normal.   Abdominal:      Palpations: Abdomen is soft. There is no hepatomegaly, splenomegaly or pulsatile mass. Tenderness: There is no right CVA tenderness, left CVA tenderness, guarding or rebound.    Musculoskeletal: General: Normal range of motion. Lymphadenopathy:      Cervical: No cervical adenopathy. Skin:     General: Skin is warm and dry. Neurological:      General: No focal deficit present. Mental Status: She is alert and oriented to person, place, and time.    Psychiatric:         Attention and Perception: Attention and perception normal.         Speech: Speech normal.         Behavior: Behavior normal.         Cognition and Memory: Cognition normal.          Assessment: 36 y.o. , , here for problem visit for several issues  Birth control counseling: will place nexplanon  Discussed meds, will refill temovate, as it makes her feel more comfortable to have an up to date script

## 2022-12-08 ENCOUNTER — OFFICE VISIT (OUTPATIENT)
Dept: OBGYN CLINIC | Age: 42
End: 2022-12-08

## 2022-12-08 VITALS
HEIGHT: 64 IN | WEIGHT: 171 LBS | BODY MASS INDEX: 29.19 KG/M2 | DIASTOLIC BLOOD PRESSURE: 82 MMHG | SYSTOLIC BLOOD PRESSURE: 118 MMHG

## 2022-12-08 DIAGNOSIS — Z30.017 NEXPLANON INSERTION: Primary | ICD-10-CM

## 2022-12-08 RX ORDER — PHENTERMINE HYDROCHLORIDE 37.5 MG/1
TABLET ORAL
COMMUNITY
Start: 2022-11-20

## 2022-12-08 RX ORDER — AZELASTINE HYDROCHLORIDE 137 UG/1
SPRAY, METERED NASAL
COMMUNITY
Start: 2022-10-13

## 2023-03-28 ENCOUNTER — TELEPHONE (OUTPATIENT)
Dept: UROLOGY | Age: 43
End: 2023-03-28

## 2023-03-29 NOTE — PROGRESS NOTES
Name: Mainor Campos  Date: 3/30/2023  YOB: 1980  LMP: Patient's last menstrual period was 2023. Cristian Hoang is a 36 y.o.   who is here for an Annual exam with c/o mood swings with period. Menses are regular with nexplanon. She did have an episode of urinary leakage. Birth control: Nexplanon Dec 2022  Menstrual status: regular cycles  Gyn Surgery: none     Health Maintenance:  Pap Smear: last pap in 3/3/22, pathology negaitve  Any history of abnormal pap smear? no  Mammo: last mammo in 2022, pathology negative  HPV vaccine: not done  Colonoscopy: not applicable  Dexa scan: not applicable    Review of Systems   Constitutional: Negative. HENT: Negative. Eyes: Negative. Respiratory: Negative. Cardiovascular: Negative. Gastrointestinal: Negative. Endocrine: Negative. Genitourinary: Negative. Musculoskeletal: Negative. Skin: Negative. Allergic/Immunologic: Negative. Neurological: Negative. Hematological: Negative. Psychiatric/Behavioral: Negative. Negative for self-injury and suicidal ideas.        Past Medical History:  No date: Lumbago  No date: Nausea & vomiting      Comment:  post-op  No date: Schizophrenia ([facility] [address]  No date: Scoliosis     Past Surgical History:  : DILATION AND CURETTAGE OF UTERUS      Comment:  removed endometrial polyp  s: HEENT      Comment:  ear surgery  s: HEENT      Comment:  cleft palate repair  : HEENT      Comment:  oral surgery  2014: HYSTEROSCOPY       Current Outpatient Medications:     Azelastine HCl 137 MCG/SPRAY SOLN, , Disp: , Rfl:     phentermine (ADIPEX-P) 37.5 MG tablet, TAKE ONE TABLET BY MOUTH ONE TIME DAILY, Disp: , Rfl:     clobetasol (TEMOVATE) 0.05 % ointment, Prn, Disp: 30 g, Rfl: 0    mirabegron (MYRBETRIQ) 50 MG TB24, Take 50 mg by mouth daily, Disp: 30 tablet, Rfl: 11    montelukast (SINGULAIR) 10 MG tablet, Take 10 mg by mouth nightly, Disp: , Rfl:     loratadine (CLARITIN)

## 2023-03-30 ENCOUNTER — OFFICE VISIT (OUTPATIENT)
Dept: OBGYN CLINIC | Age: 43
End: 2023-03-30
Payer: COMMERCIAL

## 2023-03-30 VITALS — BODY MASS INDEX: 28.84 KG/M2 | DIASTOLIC BLOOD PRESSURE: 78 MMHG | SYSTOLIC BLOOD PRESSURE: 100 MMHG | WEIGHT: 168 LBS

## 2023-03-30 DIAGNOSIS — Z01.419 WELL WOMAN EXAM WITH ROUTINE GYNECOLOGICAL EXAM: Primary | ICD-10-CM

## 2023-03-30 DIAGNOSIS — Z11.51 ENCOUNTER FOR SCREENING FOR HUMAN PAPILLOMAVIRUS (HPV): ICD-10-CM

## 2023-03-30 DIAGNOSIS — Z12.4 CERVICAL CANCER SCREENING: ICD-10-CM

## 2023-03-30 DIAGNOSIS — Z12.31 OTHER SCREENING MAMMOGRAM: ICD-10-CM

## 2023-03-30 PROCEDURE — 99396 PREV VISIT EST AGE 40-64: CPT | Performed by: OBSTETRICS & GYNECOLOGY

## 2023-03-30 ASSESSMENT — ENCOUNTER SYMPTOMS
GASTROINTESTINAL NEGATIVE: 1
EYES NEGATIVE: 1
RESPIRATORY NEGATIVE: 1
ALLERGIC/IMMUNOLOGIC NEGATIVE: 1

## 2023-04-06 LAB
CYTOLOGIST CVX/VAG CYTO: NORMAL
CYTOLOGY CVX/VAG DOC THIN PREP: NORMAL
HPV APTIMA: NEGATIVE
HPV GENOTYPE REFLEX: NORMAL
Lab: NORMAL
PATH REPORT.FINAL DX SPEC: NORMAL
STAT OF ADQ CVX/VAG CYTO-IMP: NORMAL

## 2023-04-06 RX ORDER — NORGESTIMATE AND ETHINYL ESTRADIOL 0.25-0.035
KIT ORAL
Qty: 28 TABLET | Refills: 1 | Status: SHIPPED | OUTPATIENT
Start: 2023-04-06

## 2023-05-10 ENCOUNTER — TELEPHONE (OUTPATIENT)
Dept: OBGYN CLINIC | Age: 43
End: 2023-05-10

## 2023-05-11 RX ORDER — NORGESTIMATE AND ETHINYL ESTRADIOL 0.25-0.035
1 KIT ORAL DAILY
Qty: 1 PACKET | Refills: 3 | Status: SHIPPED | OUTPATIENT
Start: 2023-05-11

## 2023-06-09 ENCOUNTER — HOSPITAL ENCOUNTER (OUTPATIENT)
Dept: MAMMOGRAPHY | Age: 43
Discharge: HOME | End: 2023-06-09
Payer: COMMERCIAL

## 2023-06-09 DIAGNOSIS — Z12.31 VISIT FOR SCREENING MAMMOGRAM: ICD-10-CM

## 2023-06-09 PROCEDURE — 77063 BREAST TOMOSYNTHESIS BI: CPT

## 2023-07-17 RX ORDER — NORGESTIMATE AND ETHINYL ESTRADIOL 0.25-0.035
KIT ORAL
Qty: 84 TABLET | Refills: 1 | OUTPATIENT
Start: 2023-07-17

## 2023-09-18 DIAGNOSIS — N32.81 OAB (OVERACTIVE BLADDER): Primary | ICD-10-CM

## 2023-09-18 DIAGNOSIS — N39.41 URGE INCONTINENCE: ICD-10-CM

## 2023-09-18 NOTE — TELEPHONE ENCOUNTER
Pt called wanting a new rx sent in to [facility].  Last Rx was sent to [facility] and [facility] is saying she is out of refills

## 2023-09-20 NOTE — PROGRESS NOTES
Name: Governor Maddox  Date: 2023  YOB: 1980  LMP: No LMP recorded. Kayla Tam is a 39 y.o.   who is here for a problem visit for nausea during menses . She is on ocp    Birth control: Nexplanon Dec 2022, ocps  Menstrual status: regular cycles  Gyn Surgery: none     Health Maintenance:  Pap Smear: last pap in 3/3/23 wnl/neg hpv  Any history of abnormal pap smear? no  Mammo: last mammo in 2022, pathology negative  HPV vaccine: not done  Colonoscopy: not applicable  Dexa scan: not applicable    Review of Systems   Constitutional: Negative. HENT: Negative. Eyes: Negative. Respiratory: Negative. Cardiovascular: Negative. Gastrointestinal: Negative. Endocrine: Negative. Genitourinary: Negative. Musculoskeletal: Negative. Skin: Negative. Allergic/Immunologic: Negative. Hematological: Negative. Psychiatric/Behavioral: Negative. Negative for self-injury and suicidal ideas.        Past Medical History:  No date: Lumbago  No date: Nausea & vomiting      Comment:  post-op  No date: Schizophrenia ([address])  No date: Scoliosis     Past Surgical History:  2014: DILATION AND CURETTAGE OF UTERUS      Comment:  removed endometrial polyp  s: HEENT      Comment:  ear surgery  : HEENT      Comment:  cleft palate repair  : HEENT      Comment:  oral surgery  2014: HYSTEROSCOPY       Current Outpatient Medications:     mirabegron (MYRBETRIQ) 50 MG TB24, Take 50 mg by mouth daily, Disp: 30 tablet, Rfl: 11    norgestimate-ethinyl estradiol (ORTHO-CYCLEN, 28,) 0.25-35 MG-MCG per tablet, Take 1 tablet by mouth daily, Disp: 1 packet, Rfl: 3    Azelastine HCl 137 MCG/SPRAY SOLN, , Disp: , Rfl:     phentermine (ADIPEX-P) 37.5 MG tablet, TAKE ONE TABLET BY MOUTH ONE TIME DAILY, Disp: , Rfl:     clobetasol (TEMOVATE) 0.05 % ointment, Prn, Disp: 30 g, Rfl: 0    montelukast (SINGULAIR) 10 MG tablet, Take 10 mg by mouth nightly, Disp: , Rfl:     loratadine (CLARITIN)

## 2023-09-21 ENCOUNTER — OFFICE VISIT (OUTPATIENT)
Dept: OBGYN CLINIC | Age: 43
End: 2023-09-21
Payer: COMMERCIAL

## 2023-09-21 VITALS — WEIGHT: 168 LBS | DIASTOLIC BLOOD PRESSURE: 78 MMHG | BODY MASS INDEX: 28.84 KG/M2 | SYSTOLIC BLOOD PRESSURE: 106 MMHG

## 2023-09-21 DIAGNOSIS — R11.0 NAUSEA: Primary | ICD-10-CM

## 2023-09-21 DIAGNOSIS — R45.4 IRRITABILITY: ICD-10-CM

## 2023-09-21 PROCEDURE — 99213 OFFICE O/P EST LOW 20 MIN: CPT | Performed by: OBSTETRICS & GYNECOLOGY

## 2023-09-21 ASSESSMENT — ENCOUNTER SYMPTOMS
RESPIRATORY NEGATIVE: 1
GASTROINTESTINAL NEGATIVE: 1
ALLERGIC/IMMUNOLOGIC NEGATIVE: 1
EYES NEGATIVE: 1

## 2023-10-26 ENCOUNTER — OFFICE VISIT (OUTPATIENT)
Dept: OBGYN CLINIC | Age: 43
End: 2023-10-26
Payer: COMMERCIAL

## 2023-10-26 VITALS — BODY MASS INDEX: 28.67 KG/M2 | SYSTOLIC BLOOD PRESSURE: 100 MMHG | DIASTOLIC BLOOD PRESSURE: 76 MMHG | WEIGHT: 167 LBS

## 2023-10-26 DIAGNOSIS — N92.6 IRREGULAR BLEEDING: Primary | ICD-10-CM

## 2023-10-26 PROCEDURE — 99213 OFFICE O/P EST LOW 20 MIN: CPT | Performed by: OBSTETRICS & GYNECOLOGY

## 2023-10-26 RX ORDER — ONDANSETRON 4 MG/1
4 TABLET, FILM COATED ORAL EVERY 8 HOURS PRN
COMMUNITY
Start: 2023-09-21

## 2023-10-26 RX ORDER — NORGESTIMATE AND ETHINYL ESTRADIOL 0.25-0.035
1 KIT ORAL DAILY
Qty: 3 PACKET | Refills: 3 | Status: SHIPPED | OUTPATIENT
Start: 2023-10-26

## 2023-10-26 ASSESSMENT — ENCOUNTER SYMPTOMS
EYES NEGATIVE: 1
GASTROINTESTINAL NEGATIVE: 1
ALLERGIC/IMMUNOLOGIC NEGATIVE: 1
RESPIRATORY NEGATIVE: 1

## 2023-10-26 NOTE — PROGRESS NOTES
Name: Joana Roth  Date: 10/26/2023  YOB: 1980  LMP: No LMP recorded. Alice Richardson is a 39 y.o.   who is here for a  discussion on Nexplanon  She is concerned that she needs another. She is taking the ocp in addition to the nexplanon because she had so much BTB with the nexplanon. Her menses are now once a month with occ clots but tolerable. Birth control: Nexplanon Dec 2022, ocps  Menstrual status: regular cycles  Gyn Surgery: none     Health Maintenance:  Pap Smear: last pap in 3/3/23 wnl/neg hpv  Any history of abnormal pap smear? no  Mammo: last mammo in 2022, pathology negative  HPV vaccine: not done  Colonoscopy: not applicable  Dexa scan: not applicable    Review of Systems   Constitutional: Negative. HENT: Negative. Eyes: Negative. Respiratory: Negative. Cardiovascular: Negative. Gastrointestinal: Negative. Endocrine: Negative. Genitourinary: Negative. Musculoskeletal: Negative. Skin: Negative. Allergic/Immunologic: Negative. Hematological: Negative. Psychiatric/Behavioral: Negative. Negative for self-injury and suicidal ideas.          Past Medical History:  No date: Lumbago  No date: Nausea & vomiting      Comment:  post-op  No date: Schizophrenia ([address])  No date: Scoliosis     Past Surgical History:  : DILATION AND CURETTAGE OF UTERUS      Comment:  removed endometrial polyp  s: HEENT      Comment:  ear surgery  : HEENT      Comment:  cleft palate repair  1991: HEENT      Comment:  oral surgery  2014: HYSTEROSCOPY       Current Outpatient Medications:     ondansetron (ZOFRAN) 4 MG tablet, Take 1 tablet by mouth every 8 hours as needed, Disp: , Rfl:     mirabegron (MYRBETRIQ) 50 MG TB24, Take 50 mg by mouth daily, Disp: 30 tablet, Rfl: 11    norgestimate-ethinyl estradiol (ORTHO-CYCLEN, 28,) 0.25-35 MG-MCG per tablet, Take 1 tablet by mouth daily, Disp: 1 packet, Rfl: 3    Azelastine HCl 137 MCG/SPRAY SOLN, , Disp: ,

## 2023-10-31 ENCOUNTER — OFFICE VISIT (OUTPATIENT)
Dept: UROLOGY | Age: 43
End: 2023-10-31
Payer: COMMERCIAL

## 2023-10-31 DIAGNOSIS — N39.41 URGE INCONTINENCE: ICD-10-CM

## 2023-10-31 DIAGNOSIS — N32.81 OAB (OVERACTIVE BLADDER): Primary | ICD-10-CM

## 2023-10-31 DIAGNOSIS — K59.00 CONSTIPATION, UNSPECIFIED CONSTIPATION TYPE: ICD-10-CM

## 2023-10-31 LAB
BILIRUBIN, URINE, POC: NEGATIVE
BLOOD URINE, POC: NORMAL
GLUCOSE URINE, POC: NEGATIVE
KETONES, URINE, POC: NEGATIVE
LEUKOCYTE ESTERASE, URINE, POC: NEGATIVE
NITRITE, URINE, POC: NEGATIVE
PH, URINE, POC: 6 (ref 4.6–8)
PROTEIN,URINE, POC: NEGATIVE
PVR, POC: 178 CC
SPECIFIC GRAVITY, URINE, POC: 1 (ref 1–1.03)
URINALYSIS CLARITY, POC: NORMAL
URINALYSIS COLOR, POC: NORMAL
UROBILINOGEN, POC: NORMAL

## 2023-10-31 PROCEDURE — 81003 URINALYSIS AUTO W/O SCOPE: CPT | Performed by: NURSE PRACTITIONER

## 2023-10-31 PROCEDURE — 99214 OFFICE O/P EST MOD 30 MIN: CPT | Performed by: NURSE PRACTITIONER

## 2023-10-31 PROCEDURE — 51798 US URINE CAPACITY MEASURE: CPT | Performed by: NURSE PRACTITIONER

## 2023-10-31 NOTE — PROGRESS NOTES
[facility]  [facility]    [address]  [facility]  [phone]          Ladonna Roldan  : 1980    Chief Complaint   Patient presents with    Follow-up     OAB    Urinary Tract Infection    Incontinence          HPI     Ladonna Roldan is a 39 y.o. female followed for OAB/UI. Was started on myrbetriq in  by Anabella Crane NP. This seems to help, but cont to have bothersome sx of UU and UF. There is heavy caffeine intake. She also struggles w constipation. PVR today is 178 cc via u/s. Reports 1 UTI since last OV. No imaging of urinary tract on file. Past Medical History:   Diagnosis Date    Lumbago     Nausea & vomiting     post-op    Schizophrenia ([address])     Scoliosis      Past Surgical History:   Procedure Laterality Date    DILATION AND CURETTAGE OF UTERUS      removed endometrial polyp    HEENT      ear surgery    HEENT      cleft palate repair    HEENT      oral surgery    HYSTEROSCOPY  2014     Current Outpatient Medications   Medication Sig Dispense Refill    ondansetron (ZOFRAN) 4 MG tablet Take 1 tablet by mouth every 8 hours as needed      norgestimate-ethinyl estradiol (ORTHO-CYCLEN, 28,) 0.25-35 MG-MCG per tablet Take 1 tablet by mouth daily 3 packet 3    mirabegron (MYRBETRIQ) 50 MG TB24 Take 50 mg by mouth daily 30 tablet 11    Azelastine HCl 137 MCG/SPRAY SOLN       phentermine (ADIPEX-P) 37.5 MG tablet TAKE ONE TABLET BY MOUTH ONE TIME DAILY      clobetasol (TEMOVATE) 0.05 % ointment Prn 30 g 0    montelukast (SINGULAIR) 10 MG tablet Take 1 tablet by mouth nightly      loratadine (CLARITIN) 10 MG tablet Take 1 tablet by mouth daily      ARIPiprazole (ABILIFY) 10 mg tablet       DULoxetine (CYMBALTA) 20 MG extended release capsule 2 capsules once a day      nystatin-triamcinolone (MYCOLOG II) 199383-7.1 UNIT/GM-% cream Apply topically 2 times daily       No current facility-administered medications for this visit. Croatian

## 2023-11-24 DIAGNOSIS — N39.41 URGE INCONTINENCE: ICD-10-CM

## 2023-11-24 DIAGNOSIS — N32.81 OAB (OVERACTIVE BLADDER): ICD-10-CM

## 2023-11-28 ENCOUNTER — OFFICE VISIT (OUTPATIENT)
Dept: UROLOGY | Age: 43
End: 2023-11-28
Payer: COMMERCIAL

## 2023-11-28 DIAGNOSIS — N32.81 OAB (OVERACTIVE BLADDER): Primary | ICD-10-CM

## 2023-11-28 DIAGNOSIS — N39.41 URGE INCONTINENCE: ICD-10-CM

## 2023-11-28 DIAGNOSIS — K59.00 CONSTIPATION, UNSPECIFIED CONSTIPATION TYPE: ICD-10-CM

## 2023-11-28 DIAGNOSIS — R33.9 URINE RETENTION: ICD-10-CM

## 2023-11-28 PROCEDURE — 99214 OFFICE O/P EST MOD 30 MIN: CPT | Performed by: NURSE PRACTITIONER

## 2023-11-28 PROCEDURE — 81003 URINALYSIS AUTO W/O SCOPE: CPT | Performed by: NURSE PRACTITIONER

## 2023-11-28 RX ORDER — MIRABEGRON 50 MG/1
50 TABLET, FILM COATED, EXTENDED RELEASE ORAL DAILY
Qty: 30 TABLET | Refills: 11 | OUTPATIENT
Start: 2023-11-28

## 2023-11-28 RX ORDER — CLOBETASOL PROPIONATE 0.5 MG/G
OINTMENT TOPICAL
Qty: 30 G | Refills: 0 | Status: SHIPPED | OUTPATIENT
Start: 2023-11-28

## 2023-11-28 ASSESSMENT — ENCOUNTER SYMPTOMS: BACK PAIN: 0

## 2023-11-28 NOTE — PROGRESS NOTES
[facility]  [address]  [facility]  [phone]          Selam Noe  : 1980    Chief Complaint   Patient presents with    Follow-up     OAB          HPI     Selam Noe is a 39 y.o. female  followed for OAB/UI. Was started on myrbetriq 50 mg in  by Clayton Strickland NP. This seems to help, but cont to have bothersome sx of UU and UF. There is heavy caffeine intake. She also struggles w constipation. PVR 10/31/23 178 cc via u/s. Reports 1 UTI since last OV. I rec daily miralax at last OV, but she stopped this after 2 weeks. Not sure if this is helping. No imaging of urinary tract on file. Menstruating today.          Past Medical History:   Diagnosis Date    Lumbago     Nausea & vomiting     post-op    Schizophrenia ([address])     Scoliosis      Past Surgical History:   Procedure Laterality Date    DILATION AND CURETTAGE OF UTERUS      removed endometrial polyp    HEENT      ear surgery    HEENT      cleft palate repair    HEENT      oral surgery    HYSTEROSCOPY  2014     Current Outpatient Medications   Medication Sig Dispense Refill    mirabegron (MYRBETRIQ) 50 MG TB24 Take 50 mg by mouth daily 30 tablet 11    ondansetron (ZOFRAN) 4 MG tablet Take 1 tablet by mouth every 8 hours as needed      norgestimate-ethinyl estradiol (ORTHO-CYCLEN, 28,) 0.25-35 MG-MCG per tablet Take 1 tablet by mouth daily 3 packet 3    Azelastine HCl 137 MCG/SPRAY SOLN       phentermine (ADIPEX-P) 37.5 MG tablet TAKE ONE TABLET BY MOUTH ONE TIME DAILY      clobetasol (TEMOVATE) 0.05 % ointment Prn 30 g 0    montelukast (SINGULAIR) 10 MG tablet Take 1 tablet by mouth nightly      loratadine (CLARITIN) 10 MG tablet Take 1 tablet by mouth daily      ARIPiprazole (ABILIFY) 10 mg tablet       DULoxetine (CYMBALTA) 20 MG extended release capsule 2 capsules once a day      nystatin-triamcinolone (MYCOLOG II) 445936-3.1 UNIT/GM-% cream Apply topically 2

## 2024-01-11 ENCOUNTER — TELEPHONE (OUTPATIENT)
Dept: OBGYN CLINIC | Age: 44
End: 2024-01-11

## 2024-01-15 RX ORDER — CLOBETASOL PROPIONATE 0.5 MG/G
OINTMENT TOPICAL
Qty: 30 G | Refills: 0 | OUTPATIENT
Start: 2024-01-15

## 2024-01-21 NOTE — PROGRESS NOTES
Monica Lane (:  1980) is a 43 y.o. female, new patient here for evaluation of the following chief complaint(s):bowel habit changes/constipation  Constipation         ASSESSMENT/PLAN:  1. Change in bowel habits  2. Bright red rectal bleeding    Colonoscopy.  High fluid and fiber diet, over-the-counter stool softener as needed       Subjective   SUBJECTIVE/OBJECTIVE  Patient presents with recurrent episode of change in bowel habits mostly constipation having to strain she has 2-3 bowel movements a week.  With occasional fresh blood per rectum if she strains most of the blood on tissue paper.  She has gas and abdominal bloating when she does not move her bowels.  Denies any family history of colon cancer.  No prior colonoscopy.    Past Medical History:   Diagnosis Date    Lumbago     Nausea & vomiting     post-op    Schizophrenia ([facility])     Scoliosis        Past Surgical History:   Procedure Laterality Date    DILATION AND CURETTAGE OF UTERUS      removed endometrial polyp    HEENT      ear surgery    HEENT      cleft palate repair    HEENT      oral surgery    HYSTEROSCOPY  2014             Allergies   Allergen Reactions    Latex Rash     Turns red    Ciprofloxacin Other (See Comments)    Cephalexin Other (See Comments)    Cyclobenzaprine Hives    Naproxen Hives          Review of Systems   Gastrointestinal:  Positive for abdominal distention and constipation.              Objective   Physical Exam  HENT:      Head: Normocephalic.      Mouth/Throat:      Mouth: Mucous membranes are moist.   Eyes:      General: No scleral icterus.  Cardiovascular:      Rate and Rhythm: Normal rate and regular rhythm.   Pulmonary:      Effort: No respiratory distress.   Abdominal:      General: There is no distension.      Tenderness: There is no abdominal tenderness. There is no rebound.   Lymphadenopathy:      Cervical: No cervical adenopathy.   Skin:     Coloration: Skin is not jaundiced.

## 2024-01-22 ENCOUNTER — OFFICE VISIT (OUTPATIENT)
Age: 44
End: 2024-01-22
Payer: COMMERCIAL

## 2024-01-22 ENCOUNTER — PREP FOR PROCEDURE (OUTPATIENT)
Dept: GASTROENTEROLOGY | Age: 44
End: 2024-01-22

## 2024-01-22 VITALS
RESPIRATION RATE: 16 BRPM | HEIGHT: 64 IN | HEART RATE: 142 BPM | DIASTOLIC BLOOD PRESSURE: 78 MMHG | BODY MASS INDEX: 29.06 KG/M2 | OXYGEN SATURATION: 99 % | WEIGHT: 170.2 LBS | SYSTOLIC BLOOD PRESSURE: 107 MMHG

## 2024-01-22 DIAGNOSIS — R19.4 CHANGE IN BOWEL HABITS: Primary | ICD-10-CM

## 2024-01-22 DIAGNOSIS — K62.5 BRIGHT RED RECTAL BLEEDING: ICD-10-CM

## 2024-01-22 DIAGNOSIS — R19.4 CHANGE IN BOWEL HABITS: ICD-10-CM

## 2024-01-22 PROCEDURE — 99203 OFFICE O/P NEW LOW 30 MIN: CPT | Performed by: INTERNAL MEDICINE

## 2024-01-22 RX ORDER — CETIRIZINE HYDROCHLORIDE 10 MG/1
10 TABLET ORAL DAILY
COMMUNITY

## 2024-01-22 RX ORDER — SODIUM CHLORIDE 0.9 % (FLUSH) 0.9 %
5-40 SYRINGE (ML) INJECTION PRN
Status: CANCELLED | OUTPATIENT
Start: 2024-01-22

## 2024-01-22 RX ORDER — SODIUM CHLORIDE 9 MG/ML
25 INJECTION, SOLUTION INTRAVENOUS PRN
Status: CANCELLED | OUTPATIENT
Start: 2024-01-22

## 2024-01-22 RX ORDER — SODIUM CHLORIDE 0.9 % (FLUSH) 0.9 %
5-40 SYRINGE (ML) INJECTION EVERY 12 HOURS SCHEDULED
Status: CANCELLED | OUTPATIENT
Start: 2024-01-22

## 2024-01-22 ASSESSMENT — ENCOUNTER SYMPTOMS
ABDOMINAL DISTENTION: 1
CONSTIPATION: 1

## 2024-01-30 RX ORDER — CLOBETASOL PROPIONATE 0.5 MG/G
OINTMENT TOPICAL
Qty: 30 G | Refills: 0 | OUTPATIENT
Start: 2024-01-30

## 2024-02-01 ENCOUNTER — OFFICE VISIT (OUTPATIENT)
Dept: OBGYN CLINIC | Age: 44
End: 2024-02-01
Payer: COMMERCIAL

## 2024-02-01 VITALS
SYSTOLIC BLOOD PRESSURE: 110 MMHG | DIASTOLIC BLOOD PRESSURE: 68 MMHG | HEIGHT: 64 IN | WEIGHT: 166 LBS | BODY MASS INDEX: 28.34 KG/M2

## 2024-02-01 DIAGNOSIS — N89.8 VAGINAL ODOR: Primary | ICD-10-CM

## 2024-02-01 DIAGNOSIS — K92.1 BLOOD IN STOOL: ICD-10-CM

## 2024-02-01 DIAGNOSIS — Z11.3 SCREEN FOR STD (SEXUALLY TRANSMITTED DISEASE): ICD-10-CM

## 2024-02-01 DIAGNOSIS — Z11.8 SCREENING EXAMINATION FOR PARASITIC INFECTION: ICD-10-CM

## 2024-02-01 PROCEDURE — 99213 OFFICE O/P EST LOW 20 MIN: CPT | Performed by: OBSTETRICS & GYNECOLOGY

## 2024-02-01 ASSESSMENT — ENCOUNTER SYMPTOMS
ALLERGIC/IMMUNOLOGIC NEGATIVE: 1
GASTROINTESTINAL NEGATIVE: 1
RESPIRATORY NEGATIVE: 1
EYES NEGATIVE: 1

## 2024-02-01 NOTE — PROGRESS NOTES
Name: Monica Lane  Date: 2024  YOB: 1980  LMP: Patient's last menstrual period was 2024 (approximate).      Monica is a 43 y.o.   who is here for a problem visit for vaginal odor  that has resolved.  But she is still concerned.  Also, she has had some blood in her stool. She saw gastro but would like a second opinion.     Birth control: Nexplanon Dec 2022, ocps  Menstrual status: regular cycles  Gyn Surgery: none     Health Maintenance:  Pap Smear: last pap in 3/3/23 wnl/neg hpv  Any history of abnormal pap smear? no  Mammo: last mammo in 2022, pathology negative  HPV vaccine: not done  Colonoscopy: not applicable  Dexa scan: not applicable    Review of Systems   Constitutional: Negative.    HENT: Negative.     Eyes: Negative.    Respiratory: Negative.     Cardiovascular: Negative.    Gastrointestinal: Negative.    Endocrine: Negative.    Genitourinary: Negative.    Musculoskeletal: Negative.    Skin: Negative.    Allergic/Immunologic: Negative.    Hematological: Negative.    Psychiatric/Behavioral: Negative.  Negative for self-injury and suicidal ideas.         Past Medical History:  No date: Lumbago  No date: Nausea & vomiting      Comment:  post-op  No date: Schizophrenia (HCC)  No date: Scoliosis     Past Surgical History:  : DILATION AND CURETTAGE OF UTERUS      Comment:  removed endometrial polyp  s: HEENT      Comment:  ear surgery  s: HEENT      Comment:  cleft palate repair  : HEENT      Comment:  oral surgery  2014: HYSTEROSCOPY       Current Outpatient Medications:     cetirizine (ZYRTEC) 10 MG tablet, Take 1 tablet by mouth daily, Disp: , Rfl:     mirabegron (MYRBETRIQ) 50 MG TB24, Take 50 mg by mouth daily, Disp: 30 tablet, Rfl: 11    clobetasol (TEMOVATE) 0.05 % ointment, Prn, Disp: 30 g, Rfl: 0    ondansetron (ZOFRAN) 4 MG tablet, Take 1 tablet by mouth every 8 hours as needed, Disp: , Rfl:     norgestimate-ethinyl estradiol (ORTHO-CYCLEN,

## 2024-02-04 LAB
A VAGINAE DNA VAG QL NAA+PROBE: NORMAL SCORE
BVAB2 DNA VAG QL NAA+PROBE: NORMAL SCORE
C ALBICANS DNA VAG QL NAA+PROBE: NEGATIVE
C GLABRATA DNA VAG QL NAA+PROBE: NEGATIVE
MEGA1 DNA VAG QL NAA+PROBE: NORMAL SCORE
SPECIMEN SOURCE: NORMAL

## 2024-02-06 ENCOUNTER — TELEPHONE (OUTPATIENT)
Dept: GASTROENTEROLOGY | Age: 44
End: 2024-02-06

## 2024-02-06 ENCOUNTER — PREP FOR PROCEDURE (OUTPATIENT)
Dept: GASTROENTEROLOGY | Age: 44
End: 2024-02-06

## 2024-02-06 DIAGNOSIS — K62.5 BRIGHT RED BLOOD PER RECTUM: ICD-10-CM

## 2024-02-06 LAB
A VAGINAE DNA VAG QL NAA+PROBE: NORMAL SCORE
BVAB2 DNA VAG QL NAA+PROBE: NORMAL SCORE
C ALBICANS DNA VAG QL NAA+PROBE: NEGATIVE
C GLABRATA DNA VAG QL NAA+PROBE: NEGATIVE
C TRACH RRNA SPEC QL NAA+PROBE: NEGATIVE
MEGA1 DNA VAG QL NAA+PROBE: NORMAL SCORE
N GONORRHOEA RRNA SPEC QL NAA+PROBE: NEGATIVE
SPECIMEN SOURCE: NORMAL
T VAGINALIS RRNA SPEC QL NAA+PROBE: NEGATIVE

## 2024-02-06 RX ORDER — SODIUM CHLORIDE 0.9 % (FLUSH) 0.9 %
5-40 SYRINGE (ML) INJECTION EVERY 12 HOURS SCHEDULED
Status: CANCELLED | OUTPATIENT
Start: 2024-02-06

## 2024-02-06 RX ORDER — SODIUM CHLORIDE 9 MG/ML
25 INJECTION, SOLUTION INTRAVENOUS PRN
Status: CANCELLED | OUTPATIENT
Start: 2024-02-06

## 2024-02-06 RX ORDER — SODIUM CHLORIDE 0.9 % (FLUSH) 0.9 %
5-40 SYRINGE (ML) INJECTION PRN
Status: CANCELLED | OUTPATIENT
Start: 2024-02-06

## 2024-02-06 NOTE — TELEPHONE ENCOUNTER
Patient called asked case to be moved up to February (from 3/6);    I told her I can move her case up to 2/22, but her procedure would be done at [facility].       During OV; she wanted ES; She stated [facility] was fine and 2/22 was fine.

## 2024-02-07 ENCOUNTER — TELEPHONE (OUTPATIENT)
Dept: OBGYN CLINIC | Age: 44
End: 2024-02-07

## 2024-02-07 NOTE — TELEPHONE ENCOUNTER
Pt LVM regarding a notification about recent lab results. Called patient back with results of nuswab from 2/1/24. Patient verbalized understanding.

## 2024-02-19 ENCOUNTER — TELEPHONE (OUTPATIENT)
Dept: GASTROENTEROLOGY | Age: 44
End: 2024-02-19

## 2024-02-19 NOTE — TELEPHONE ENCOUNTER
Pt called requesting time of procedure, I returned call back and let the pt know that the OR would call her the day before between 1 pm and 4 pm for the exact arrival time of the procedure, pt verbalized understanding

## 2024-02-21 ENCOUNTER — TELEPHONE (OUTPATIENT)
Dept: UROLOGY | Age: 44
End: 2024-02-21

## 2024-02-21 ENCOUNTER — TELEPHONE (OUTPATIENT)
Age: 44
End: 2024-02-21

## 2024-02-21 DIAGNOSIS — K59.00 CONSTIPATION, UNSPECIFIED CONSTIPATION TYPE: Primary | ICD-10-CM

## 2024-02-29 ENCOUNTER — OFFICE VISIT (OUTPATIENT)
Dept: UROLOGY | Age: 44
End: 2024-02-29
Payer: COMMERCIAL

## 2024-02-29 DIAGNOSIS — N39.41 URGE INCONTINENCE: ICD-10-CM

## 2024-02-29 DIAGNOSIS — N32.81 OAB (OVERACTIVE BLADDER): Primary | ICD-10-CM

## 2024-02-29 DIAGNOSIS — K59.00 CONSTIPATION, UNSPECIFIED CONSTIPATION TYPE: ICD-10-CM

## 2024-02-29 LAB
BILIRUBIN, URINE, POC: NEGATIVE
BLOOD URINE, POC: NEGATIVE
GLUCOSE URINE, POC: NEGATIVE
KETONES, URINE, POC: NEGATIVE
LEUKOCYTE ESTERASE, URINE, POC: NEGATIVE
NITRITE, URINE, POC: NEGATIVE
PH, URINE, POC: 5.5 (ref 4.6–8)
PROTEIN,URINE, POC: NEGATIVE
PVR, POC: 4 CC
SPECIFIC GRAVITY, URINE, POC: NORMAL (ref 1–1.03)
URINALYSIS CLARITY, POC: NORMAL
URINALYSIS COLOR, POC: NORMAL
UROBILINOGEN, POC: NORMAL

## 2024-02-29 PROCEDURE — 99214 OFFICE O/P EST MOD 30 MIN: CPT | Performed by: NURSE PRACTITIONER

## 2024-02-29 PROCEDURE — 81003 URINALYSIS AUTO W/O SCOPE: CPT | Performed by: NURSE PRACTITIONER

## 2024-02-29 PROCEDURE — 51798 US URINE CAPACITY MEASURE: CPT | Performed by: NURSE PRACTITIONER

## 2024-02-29 ASSESSMENT — ENCOUNTER SYMPTOMS: BACK PAIN: 0

## 2024-02-29 NOTE — PROGRESS NOTES
[facility]  [address] [address]  [phone]          Monica Lane  : 1980    Chief Complaint   Patient presents with    Follow-up     OAB          HPI     Monica Lane is a 43 y.o. female followed for OAB/UI. Was started on myrbetriq 50 mg in  by Annette Abbott NP. This seems to help, but cont to have bothersome sx of UU and UF. There is heavy caffeine intake. PVR 10/31/23 178 cc via u/s. PVR today is 4 cc via u/s. Sev psych meds.      She also struggles w constipation. She was seen by GI (Dr. Deluca) and scheduled for colo, however physician left the practice. She has requested a new referral to [facility]               Past Medical History:   Diagnosis Date    Lumbago     Nausea & vomiting     post-op    Schizophrenia (HCC)     Scoliosis      Past Surgical History:   Procedure Laterality Date    DILATION AND CURETTAGE OF UTERUS      removed endometrial polyp    HEENT      ear surgery    HEENT      cleft palate repair    HEENT      oral surgery    HYSTEROSCOPY  2014     Current Outpatient Medications   Medication Sig Dispense Refill    cetirizine (ZYRTEC) 10 MG tablet Take 1 tablet by mouth daily      mirabegron (MYRBETRIQ) 50 MG TB24 Take 50 mg by mouth daily 30 tablet 11    clobetasol (TEMOVATE) 0.05 % ointment Prn 30 g 0    ondansetron (ZOFRAN) 4 MG tablet Take 1 tablet by mouth every 8 hours as needed      norgestimate-ethinyl estradiol (ORTHO-CYCLEN, 28,) 0.25-35 MG-MCG per tablet Take 1 tablet by mouth daily 3 packet 3    Azelastine HCl 137 MCG/SPRAY SOLN       phentermine (ADIPEX-P) 37.5 MG tablet TAKE ONE TABLET BY MOUTH ONE TIME DAILY      montelukast (SINGULAIR) 10 MG tablet Take 1 tablet by mouth nightly      ARIPiprazole (ABILIFY) 10 mg tablet       DULoxetine (CYMBALTA) 20 MG extended release capsule 2 capsules once a day      nystatin-triamcinolone (MYCOLOG II) 506217-5.1 UNIT/GM-% cream Apply topically 2

## 2024-04-03 SDOH — ECONOMIC STABILITY: HOUSING INSECURITY
IN THE LAST 12 MONTHS, WAS THERE A TIME WHEN YOU DID NOT HAVE A STEADY PLACE TO SLEEP OR SLEPT IN A SHELTER (INCLUDING NOW)?: PATIENT DECLINED

## 2024-04-03 SDOH — ECONOMIC STABILITY: TRANSPORTATION INSECURITY
IN THE PAST 12 MONTHS, HAS LACK OF TRANSPORTATION KEPT YOU FROM MEETINGS, WORK, OR FROM GETTING THINGS NEEDED FOR DAILY LIVING?: PATIENT DECLINED

## 2024-04-03 SDOH — ECONOMIC STABILITY: INCOME INSECURITY: HOW HARD IS IT FOR YOU TO PAY FOR THE VERY BASICS LIKE FOOD, HOUSING, MEDICAL CARE, AND HEATING?: PATIENT DECLINED

## 2024-04-03 SDOH — ECONOMIC STABILITY: FOOD INSECURITY: WITHIN THE PAST 12 MONTHS, YOU WORRIED THAT YOUR FOOD WOULD RUN OUT BEFORE YOU GOT MONEY TO BUY MORE.: PATIENT DECLINED

## 2024-04-03 SDOH — ECONOMIC STABILITY: FOOD INSECURITY: WITHIN THE PAST 12 MONTHS, THE FOOD YOU BOUGHT JUST DIDN'T LAST AND YOU DIDN'T HAVE MONEY TO GET MORE.: PATIENT DECLINED

## 2024-04-03 ASSESSMENT — ENCOUNTER SYMPTOMS
GASTROINTESTINAL NEGATIVE: 1
ALLERGIC/IMMUNOLOGIC NEGATIVE: 1
EYES NEGATIVE: 1
RESPIRATORY NEGATIVE: 1

## 2024-04-03 NOTE — PROGRESS NOTES
Name: Monica Lane  Date: 2024  YOB: 1980  LMP: Patient's last menstrual period was 2024.      Monica is a 43 y.o.   who is here for a annual exam. No gyn complaints.    Birth control: Nexplanon Dec 2022, ocps  Menstrual status: regular cycles  Gyn Surgery: none     Health Maintenance:  Pap Smear: last pap in 3/3/23 wnl/neg hpv  Any history of abnormal pap smear? no  Mammo: last mammo in 23 Negative  HPV vaccine: not done  Colonoscopy: not applicable  Dexa scan: not applicable    Review of Systems   Constitutional: Negative.    HENT: Negative.     Eyes: Negative.    Respiratory: Negative.     Cardiovascular: Negative.    Gastrointestinal: Negative.    Endocrine: Negative.    Genitourinary: Negative.    Musculoskeletal: Negative.    Skin: Negative.    Allergic/Immunologic: Negative.    Hematological: Negative.    Psychiatric/Behavioral: Negative.  Negative for self-injury and suicidal ideas.         Past Medical History:  No date: Lumbago  No date: Nausea & vomiting      Comment:  post-op  No date: Schizophrenia (HCC)  No date: Scoliosis     Past Surgical History:  : DILATION AND CURETTAGE OF UTERUS      Comment:  removed endometrial polyp  s: HEENT      Comment:  ear surgery  : HEENT      Comment:  cleft palate repair  : HEENT      Comment:  oral surgery  2014: HYSTEROSCOPY       Current Outpatient Medications:     LINZESS 145 MCG capsule, PLEASE SEE ATTACHED FOR DETAILED DIRECTIONS, Disp: , Rfl:     cetirizine (ZYRTEC) 10 MG tablet, Take 1 tablet by mouth daily, Disp: , Rfl:     mirabegron (MYRBETRIQ) 50 MG TB24, Take 50 mg by mouth daily, Disp: 30 tablet, Rfl: 11    clobetasol (TEMOVATE) 0.05 % ointment, Prn, Disp: 30 g, Rfl: 0    ondansetron (ZOFRAN) 4 MG tablet, Take 1 tablet by mouth every 8 hours as needed, Disp: , Rfl:     norgestimate-ethinyl estradiol (ORTHO-CYCLEN, 28,) 0.25-35 MG-MCG per tablet, Take 1 tablet by mouth daily, Disp: 3 packet,

## 2024-04-04 ENCOUNTER — OFFICE VISIT (OUTPATIENT)
Dept: OBGYN CLINIC | Age: 44
End: 2024-04-04
Payer: COMMERCIAL

## 2024-04-04 VITALS
SYSTOLIC BLOOD PRESSURE: 108 MMHG | BODY MASS INDEX: 28.68 KG/M2 | WEIGHT: 168 LBS | HEIGHT: 64 IN | DIASTOLIC BLOOD PRESSURE: 68 MMHG

## 2024-04-04 DIAGNOSIS — Z12.4 CERVICAL CANCER SCREENING: ICD-10-CM

## 2024-04-04 DIAGNOSIS — Z01.419 WELL WOMAN EXAM WITH ROUTINE GYNECOLOGICAL EXAM: Primary | ICD-10-CM

## 2024-04-04 DIAGNOSIS — Z01.419 WELL WOMAN EXAM WITH ROUTINE GYNECOLOGICAL EXAM: ICD-10-CM

## 2024-04-04 PROCEDURE — 99396 PREV VISIT EST AGE 40-64: CPT | Performed by: OBSTETRICS & GYNECOLOGY

## 2024-04-04 PROCEDURE — 99459 PELVIC EXAMINATION: CPT | Performed by: OBSTETRICS & GYNECOLOGY

## 2024-04-04 RX ORDER — LINACLOTIDE 145 UG/1
CAPSULE, GELATIN COATED ORAL
COMMUNITY
Start: 2024-03-26

## 2024-04-04 ASSESSMENT — PATIENT HEALTH QUESTIONNAIRE - PHQ9
SUM OF ALL RESPONSES TO PHQ QUESTIONS 1-9: 0
SUM OF ALL RESPONSES TO PHQ9 QUESTIONS 1 & 2: 0
2. FEELING DOWN, DEPRESSED OR HOPELESS: NOT AT ALL
1. LITTLE INTEREST OR PLEASURE IN DOING THINGS: NOT AT ALL
SUM OF ALL RESPONSES TO PHQ QUESTIONS 1-9: 0

## 2024-04-09 LAB
COLLECTION METHOD: NORMAL
CYTOLOGIST CVX/VAG CYTO: NORMAL
CYTOLOGY CVX/VAG DOC THIN PREP: NORMAL
DATE OF LMP: NORMAL
HPV REFLEX: NORMAL
Lab: NORMAL
PAP SOURCE: NORMAL
PATH REPORT.FINAL DX SPEC: NORMAL
STAT OF ADQ CVX/VAG CYTO-IMP: NORMAL

## 2024-04-16 RX ORDER — CLOBETASOL PROPIONATE 0.5 MG/G
OINTMENT TOPICAL
Qty: 30 G | Refills: 0 | Status: SHIPPED | OUTPATIENT
Start: 2024-04-16

## 2024-06-10 ENCOUNTER — HOSPITAL ENCOUNTER (OUTPATIENT)
Dept: MAMMOGRAPHY | Age: 44
Discharge: HOME | End: 2024-06-13
Attending: OBSTETRICS & GYNECOLOGY
Payer: COMMERCIAL

## 2024-06-10 DIAGNOSIS — Z12.31 VISIT FOR SCREENING MAMMOGRAM: ICD-10-CM

## 2024-06-10 PROCEDURE — 77063 BREAST TOMOSYNTHESIS BI: CPT

## 2024-07-29 NOTE — PROGRESS NOTES
Monica  is a 43 y.o. female, , No LMP recorded (lmp unknown). Patient has had an implant.,  who is seen for gyn ultrasound performed secondary to irregular menses.   Per patient had irregular bleeding in .   Currently has Nexplanon, placed on 2022. Also taking OCP's.   Had annual exam on 24 with Claritza Botello MD with [facility].    Last pap smear: 2024 Negative. HPV not tested.      period was stringy and approximately 2 weeks early    Ultrasound findings from today:  TV GYN US PREFORMED   Uterus is small, partially retroverted/straight back and heterogenous.   F1 MITA anterior SS= 1 cm   Endo= 2.75 mm. No intracavitary mass visualized.   ROV appears WNL.   LOV appears WNL.   No free fluid.   Uterine Volume 49.085 cm³     HISTORY:  Sexual History:  has sex with males  Contraception:  Nexplanon   Current Outpatient Medications on File Prior to Visit   Medication Sig Dispense Refill    etonogestrel (NEXPLANON) 68 MG implant Inject 68 mg into the skin once      clobetasol (TEMOVATE) 0.05 % ointment Prn 30 g 0    LINZESS 145 MCG capsule PLEASE SEE ATTACHED FOR DETAILED DIRECTIONS      cetirizine (ZYRTEC) 10 MG tablet Take 1 tablet by mouth daily      mirabegron (MYRBETRIQ) 50 MG TB24 Take 50 mg by mouth daily 30 tablet 11    ondansetron (ZOFRAN) 4 MG tablet Take 1 tablet by mouth every 8 hours as needed      norgestimate-ethinyl estradiol (ORTHO-CYCLEN, 28,) 0.25-35 MG-MCG per tablet Take 1 tablet by mouth daily 3 packet 3    Azelastine HCl 137 MCG/SPRAY SOLN       phentermine (ADIPEX-P) 37.5 MG tablet TAKE ONE TABLET BY MOUTH ONE TIME DAILY      montelukast (SINGULAIR) 10 MG tablet Take 1 tablet by mouth nightly      ARIPiprazole (ABILIFY) 10 mg tablet       DULoxetine (CYMBALTA) 20 MG extended release capsule 2 capsules once a day      nystatin-triamcinolone (MYCOLOG II) 009070-1.1 UNIT/GM-% cream Apply topically 2 times daily       No current facility-administered

## 2024-07-30 ENCOUNTER — PROCEDURE VISIT (OUTPATIENT)
Dept: OBGYN CLINIC | Age: 44
End: 2024-07-30
Payer: COMMERCIAL

## 2024-07-30 ENCOUNTER — OFFICE VISIT (OUTPATIENT)
Dept: OBGYN CLINIC | Age: 44
End: 2024-07-30
Payer: COMMERCIAL

## 2024-07-30 VITALS
SYSTOLIC BLOOD PRESSURE: 108 MMHG | BODY MASS INDEX: 28.44 KG/M2 | WEIGHT: 166.6 LBS | HEIGHT: 64 IN | DIASTOLIC BLOOD PRESSURE: 76 MMHG

## 2024-07-30 DIAGNOSIS — N92.6 IRREGULAR BLEEDING: Primary | ICD-10-CM

## 2024-07-30 PROCEDURE — 76830 TRANSVAGINAL US NON-OB: CPT | Performed by: OBSTETRICS & GYNECOLOGY

## 2024-07-30 PROCEDURE — 99214 OFFICE O/P EST MOD 30 MIN: CPT | Performed by: OBSTETRICS & GYNECOLOGY

## 2024-08-22 RX ORDER — CLOBETASOL PROPIONATE 0.5 MG/G
OINTMENT TOPICAL
Qty: 30 G | Refills: 0 | Status: SHIPPED | OUTPATIENT
Start: 2024-08-22

## 2024-09-16 RX ORDER — NORGESTIMATE AND ETHINYL ESTRADIOL 0.25-0.035
1 KIT ORAL DAILY
Qty: 3 PACKET | Refills: 1 | Status: SHIPPED | OUTPATIENT
Start: 2024-09-16

## 2024-09-16 RX ORDER — NORGESTIMATE AND ETHINYL ESTRADIOL 0.25-0.035
1 KIT ORAL DAILY
Qty: 84 TABLET | Refills: 3 | OUTPATIENT
Start: 2024-09-16

## 2024-09-26 ENCOUNTER — OFFICE VISIT (OUTPATIENT)
Dept: UROLOGY | Age: 44
End: 2024-09-26
Payer: COMMERCIAL

## 2024-09-26 DIAGNOSIS — N39.41 URGE INCONTINENCE: ICD-10-CM

## 2024-09-26 DIAGNOSIS — K59.00 CONSTIPATION, UNSPECIFIED CONSTIPATION TYPE: ICD-10-CM

## 2024-09-26 DIAGNOSIS — N32.81 OAB (OVERACTIVE BLADDER): Primary | ICD-10-CM

## 2024-09-26 LAB
BILIRUBIN, URINE, POC: NEGATIVE
BLOOD URINE, POC: NEGATIVE
GLUCOSE URINE, POC: NEGATIVE
KETONES, URINE, POC: NEGATIVE
LEUKOCYTE ESTERASE, URINE, POC: NORMAL
NITRITE, URINE, POC: NEGATIVE
PH, URINE, POC: 5.5 (ref 4.6–8)
PROTEIN,URINE, POC: NEGATIVE
SPECIFIC GRAVITY, URINE, POC: 1.02 (ref 1–1.03)
URINALYSIS CLARITY, POC: NORMAL
URINALYSIS COLOR, POC: NORMAL
UROBILINOGEN, POC: NORMAL

## 2024-09-26 PROCEDURE — 99214 OFFICE O/P EST MOD 30 MIN: CPT | Performed by: NURSE PRACTITIONER

## 2024-09-26 PROCEDURE — 81003 URINALYSIS AUTO W/O SCOPE: CPT | Performed by: NURSE PRACTITIONER

## 2024-09-26 RX ORDER — MIRABEGRON 50 MG/1
50 TABLET, EXTENDED RELEASE ORAL DAILY
Qty: 90 TABLET | Refills: 3 | Status: SHIPPED | OUTPATIENT
Start: 2024-09-26

## 2024-10-29 ENCOUNTER — TRANSCRIBE ORDERS (OUTPATIENT)
Dept: SCHEDULING | Age: 44
End: 2024-10-29

## 2024-10-29 DIAGNOSIS — Z12.31 VISIT FOR SCREENING MAMMOGRAM: Primary | ICD-10-CM

## 2024-11-25 RX ORDER — CLOBETASOL PROPIONATE 0.5 MG/G
OINTMENT TOPICAL
Qty: 30 G | Refills: 0 | Status: SHIPPED | OUTPATIENT
Start: 2024-11-25

## 2024-11-25 RX ORDER — CLOBETASOL PROPIONATE 0.5 MG/G
OINTMENT TOPICAL
Qty: 30 G | Refills: 0 | OUTPATIENT
Start: 2024-11-25

## 2024-12-05 ENCOUNTER — TELEPHONE (OUTPATIENT)
Dept: OBGYN CLINIC | Age: 44
End: 2024-12-05

## 2024-12-05 RX ORDER — CLOBETASOL PROPIONATE 0.5 MG/G
OINTMENT TOPICAL
Qty: 30 G | Refills: 2 | Status: SHIPPED | OUTPATIENT
Start: 2024-12-05

## 2024-12-05 NOTE — TELEPHONE ENCOUNTER
Gyn patient called requesting refills on Clobetasol.   Spoke with the patient. Prescription requested has been denied because it does not look like Dr. Michaels has prescribed or refilled this prescription in the past. Recommended the patient reach out to Dr. Botello's office because it seems that this is the original prescriber and it's who typically refills the Clobetasol prescription. All questions were answered.

## 2024-12-19 DIAGNOSIS — N32.81 OAB (OVERACTIVE BLADDER): ICD-10-CM

## 2024-12-19 DIAGNOSIS — N39.41 URGE INCONTINENCE: ICD-10-CM

## 2024-12-19 RX ORDER — CLOBETASOL PROPIONATE 0.5 MG/G
OINTMENT TOPICAL
Qty: 30 G | Refills: 2 | OUTPATIENT
Start: 2024-12-19

## 2024-12-19 RX ORDER — MIRABEGRON 50 MG/1
50 TABLET, FILM COATED, EXTENDED RELEASE ORAL DAILY
Qty: 30 TABLET | Refills: 11 | Status: SHIPPED | OUTPATIENT
Start: 2024-12-19

## 2024-12-19 RX ORDER — NORGESTIMATE AND ETHINYL ESTRADIOL 0.25-0.035
1 KIT ORAL DAILY
Qty: 84 TABLET | Refills: 1 | OUTPATIENT
Start: 2024-12-19

## 2025-02-17 RX ORDER — CLOBETASOL PROPIONATE 0.5 MG/G
OINTMENT TOPICAL
Qty: 30 G | Refills: 2 | Status: SHIPPED | OUTPATIENT
Start: 2025-02-17

## 2025-02-17 RX ORDER — NORGESTIMATE AND ETHINYL ESTRADIOL 0.25-0.035
1 KIT ORAL DAILY
Qty: 84 TABLET | Refills: 1 | Status: SHIPPED | OUTPATIENT
Start: 2025-02-17

## 2025-04-20 SDOH — ECONOMIC STABILITY: INCOME INSECURITY: IN THE LAST 12 MONTHS, WAS THERE A TIME WHEN YOU WERE NOT ABLE TO PAY THE MORTGAGE OR RENT ON TIME?: NO

## 2025-04-20 SDOH — ECONOMIC STABILITY: TRANSPORTATION INSECURITY
IN THE PAST 12 MONTHS, HAS LACK OF TRANSPORTATION KEPT YOU FROM MEETINGS, WORK, OR FROM GETTING THINGS NEEDED FOR DAILY LIVING?: NO

## 2025-04-20 SDOH — ECONOMIC STABILITY: FOOD INSECURITY: WITHIN THE PAST 12 MONTHS, THE FOOD YOU BOUGHT JUST DIDN'T LAST AND YOU DIDN'T HAVE MONEY TO GET MORE.: NEVER TRUE

## 2025-04-20 SDOH — ECONOMIC STABILITY: FOOD INSECURITY: WITHIN THE PAST 12 MONTHS, YOU WORRIED THAT YOUR FOOD WOULD RUN OUT BEFORE YOU GOT MONEY TO BUY MORE.: NEVER TRUE

## 2025-04-20 SDOH — ECONOMIC STABILITY: TRANSPORTATION INSECURITY
IN THE PAST 12 MONTHS, HAS THE LACK OF TRANSPORTATION KEPT YOU FROM MEDICAL APPOINTMENTS OR FROM GETTING MEDICATIONS?: NO

## 2025-04-23 ENCOUNTER — OFFICE VISIT (OUTPATIENT)
Dept: OBGYN CLINIC | Age: 45
End: 2025-04-23
Payer: COMMERCIAL

## 2025-04-23 VITALS
HEIGHT: 64 IN | SYSTOLIC BLOOD PRESSURE: 120 MMHG | DIASTOLIC BLOOD PRESSURE: 70 MMHG | BODY MASS INDEX: 29.02 KG/M2 | WEIGHT: 170 LBS

## 2025-04-23 DIAGNOSIS — Z12.31 ENCOUNTER FOR SCREENING MAMMOGRAM FOR MALIGNANT NEOPLASM OF BREAST: ICD-10-CM

## 2025-04-23 DIAGNOSIS — Z11.51 SCREENING FOR HPV (HUMAN PAPILLOMAVIRUS): ICD-10-CM

## 2025-04-23 DIAGNOSIS — Z12.4 CERVICAL CANCER SCREENING: ICD-10-CM

## 2025-04-23 DIAGNOSIS — Z01.419 WELL WOMAN EXAM WITH ROUTINE GYNECOLOGICAL EXAM: Primary | ICD-10-CM

## 2025-04-23 PROCEDURE — 99459 PELVIC EXAMINATION: CPT | Performed by: OBSTETRICS & GYNECOLOGY

## 2025-04-23 PROCEDURE — 99396 PREV VISIT EST AGE 40-64: CPT | Performed by: OBSTETRICS & GYNECOLOGY

## 2025-04-23 RX ORDER — NORGESTIMATE AND ETHINYL ESTRADIOL 0.25-0.035
1 KIT ORAL DAILY
Qty: 84 TABLET | Refills: 3 | Status: SHIPPED | OUTPATIENT
Start: 2025-04-23

## 2025-04-23 ASSESSMENT — ENCOUNTER SYMPTOMS
ALLERGIC/IMMUNOLOGIC NEGATIVE: 1
EYES NEGATIVE: 1
RESPIRATORY NEGATIVE: 1
GASTROINTESTINAL NEGATIVE: 1

## 2025-04-23 NOTE — PROGRESS NOTES
Name: Monica Lane  Date: 2025  YOB: 1980  LMP: No LMP recorded. Patient has had an implant.      Monica is a 44 y.o.   who is here for a annual exam. No gyn complaints. Happy on ocp.     Monica  reports that she is not currently sexually active and has had partner(s) who are male. She reports using the following methods of birth control/protection: Pill and Implant.  Contraception: OCP (estrogen/progesterone).   Menstrual status: regular cycles  Gyn Surgery: none     Women's Health Timeline:  : nexplanon inserted  : mammo cat 1, TC12%      Pap History:  Diagnosis:   Date Value Ref Range Status   2024 Comment   Final     Comment:     (NOTE)  NEGATIVE FOR INTRAEPITHELIAL LESION OR MALIGNANCY.     2023 Comment   Final     Comment:     (NOTE)  NEGATIVE FOR INTRAEPITHELIAL LESION OR MALIGNANCY.     2022 Comment  Final     Comment:     NEGATIVE FOR INTRAEPITHELIAL LESION OR MALIGNANCY.     HPV Aptima   Date Value Ref Range Status   2023 Negative Negative   Final     Comment:     (NOTE)  This nucleic acid amplification test detects fourteen high-risk  HPV types (16,18,31,33,35,39,45,51,52,56,58,59,66,68) without  differentiation.       HPV Genotype Reflex   Date Value Ref Range Status   2023 Comment   Final     Comment:     (NOTE)  Criteria not met, HPV Genotype not performed.  No. of containers..01 ThinPrep Vial  Performed At: [facility] [id number]  Sascha Crisostomo MD Ph:[phone]  Performed At: [facility] [id number]  Sascha Crisostomo MD Ph:[phone]          OB History:  OB History          1    Para   0    Term   0            AB   1    Living             SAB   1    IAB        Ectopic        Molar        Multiple        Live Births                     Medical History:  Past Medical History:  No date: Lumbago  No date: Nausea & vomiting      Comment:

## 2025-04-25 LAB
COLLECTION METHOD: NORMAL
CYTOLOGIST CVX/VAG CYTO: NORMAL
CYTOLOGY CVX/VAG DOC THIN PREP: NORMAL
DATE OF LMP: NORMAL
HPV APTIMA: NEGATIVE
Lab: NORMAL
OTHER PT INFO: NORMAL
PAP SOURCE: NORMAL
PATH REPORT.FINAL DX SPEC: NORMAL
PREV CYTO INFO: NORMAL
PREV TREATMENT RESULTS: NEGATIVE
PREV TREATMENT: NORMAL
STAT OF ADQ CVX/VAG CYTO-IMP: NORMAL

## 2025-04-30 ENCOUNTER — OFFICE VISIT (OUTPATIENT)
Dept: OBGYN CLINIC | Age: 45
End: 2025-04-30
Payer: COMMERCIAL

## 2025-04-30 VITALS — BODY MASS INDEX: 29.18 KG/M2 | WEIGHT: 170 LBS

## 2025-04-30 DIAGNOSIS — Z30.46 NEXPLANON REMOVAL: Primary | ICD-10-CM

## 2025-04-30 PROCEDURE — 11982 REMOVE DRUG IMPLANT DEVICE: CPT | Performed by: OBSTETRICS & GYNECOLOGY

## 2025-04-30 NOTE — PROGRESS NOTES
blade  Nexplanon removed with curved cesar  Area cleansed and dressed  Pt tolerated procedure well.     Assessment: Monica is a  44 y.o. here for problem visit for nexplanon removal      Claritza Botello MD

## 2025-06-12 ENCOUNTER — HOSPITAL ENCOUNTER (OUTPATIENT)
Dept: MAMMOGRAPHY | Age: 45
Discharge: HOME | End: 2025-06-15
Payer: COMMERCIAL

## 2025-06-12 DIAGNOSIS — Z12.31 VISIT FOR SCREENING MAMMOGRAM: ICD-10-CM

## 2025-06-12 PROCEDURE — 77063 BREAST TOMOSYNTHESIS BI: CPT

## 2025-07-28 ENCOUNTER — TELEPHONE (OUTPATIENT)
Dept: OBGYN CLINIC | Age: 45
End: 2025-07-28

## 2025-07-28 NOTE — TELEPHONE ENCOUNTER
Pt left VM requesting rx for birth control to be cancelled at pharmacy. Rx cancelled as requested.

## 2025-08-27 ENCOUNTER — OFFICE VISIT (OUTPATIENT)
Dept: OBGYN CLINIC | Age: 45
End: 2025-08-27
Payer: COMMERCIAL

## 2025-08-27 VITALS
WEIGHT: 170.02 LBS | BODY MASS INDEX: 29.18 KG/M2 | SYSTOLIC BLOOD PRESSURE: 102 MMHG | DIASTOLIC BLOOD PRESSURE: 78 MMHG

## 2025-08-27 DIAGNOSIS — N92.6 MISSED MENSES: Primary | ICD-10-CM

## 2025-08-27 LAB
HCG SERPL-ACNC: <1 MIU/ML
HCG, PREGNANCY, URINE, POC: NEGATIVE
VALID INTERNAL CONTROL, POC: YES

## 2025-08-27 PROCEDURE — 99459 PELVIC EXAMINATION: CPT | Performed by: OBSTETRICS & GYNECOLOGY

## 2025-08-27 PROCEDURE — 81025 URINE PREGNANCY TEST: CPT | Performed by: OBSTETRICS & GYNECOLOGY

## 2025-08-27 PROCEDURE — 99214 OFFICE O/P EST MOD 30 MIN: CPT | Performed by: OBSTETRICS & GYNECOLOGY

## 2025-08-27 ASSESSMENT — ENCOUNTER SYMPTOMS
RESPIRATORY NEGATIVE: 1
GASTROINTESTINAL NEGATIVE: 1
EYES NEGATIVE: 1
ALLERGIC/IMMUNOLOGIC NEGATIVE: 1

## 2025-09-03 RX ORDER — DESOGESTREL AND ETHINYL ESTRADIOL 21-5 (28)
1 KIT ORAL DAILY
Qty: 3 PACKET | Refills: 3 | Status: SHIPPED | OUTPATIENT
Start: 2025-09-03